# Patient Record
Sex: MALE | Race: OTHER | Employment: OTHER | URBAN - METROPOLITAN AREA
[De-identification: names, ages, dates, MRNs, and addresses within clinical notes are randomized per-mention and may not be internally consistent; named-entity substitution may affect disease eponyms.]

---

## 2024-02-15 ENCOUNTER — ANESTHESIA EVENT (OUTPATIENT)
Dept: ANESTHESIOLOGY | Facility: HOSPITAL | Age: 64
End: 2024-02-15

## 2024-02-15 ENCOUNTER — ANESTHESIA (OUTPATIENT)
Dept: ANESTHESIOLOGY | Facility: HOSPITAL | Age: 64
End: 2024-02-15

## 2024-02-15 NOTE — H&P (VIEW-ONLY)
Assessment  Diagnoses and all orders for this visit:    Recurrent Tear of left supraspinatus tendon        Discussion and Plan:    Unfortunately the patient does present with a recurrent full-thickness supraspinatus tendon tear that thankfully has minimal atrophy and is not significantly retracted.  Given his lack of improvement even in the acute postoperative phase from his previous left shoulder surgery it is likely this is a nonhealed repair, given the full-thickness nature of the tear and his lack of improvement following the surgery he is indicated at this time for revision arthroscopic rotator cuff repair.  We discussed the use of corticosteroid injection and further physical therapy but the patient did not wish to continue to go through nonoperative care as he had that extensively before surgery and following surgery and since has been living with the symptoms for such a long period of time I do think it is reasonable to consider repair at this time.  He understands that repair results in the revision setting are not as consistent as in the primary setting.    A thorough discussion was performed with the patient regarding the risks and benefit of operative and nonoperative treatment of their rotator cuff tear.  Risks discussed include but were not limited to infection, neurovascular injury, recurrent tear, nonhealing of the repair, need for further surgery, need for biceps tenodesis or tenotomy, stiffness, need for prolonged rehabilitation, as well as the risk of anesthesia.  After this discussion all questions were answered and informed consent was obtained in the office for arthroscopic revision rotator cuff repair of the left shoulder.  The patient will be scheduled for this procedure accordingly.     Subjective:   Patient ID: Ralph Manning is a 63 y.o. male      HPI  The patient presents with a chief complaint of left shoulder pain.  Patient reports he has a left shoulder open RTC repair at [facility] in 2015  "or 2016 at [facility].  He reports the shoulder never really got better after the surgery.  The pain began to get worse 1 year(s) ago and is not associated with an acute injury. The patient describes the pain as aching, dull, and sharp in intensity,  intermittent in timing, and localizes the pain to the  leftsubacromial joint.  The pain is worse with movement, overuse, and raising arm over head and relieved by rest.  The pain is not associated with numbness and tingling.  The pain is not associated with constitutional symptoms. The patient is awoken at night by the pain.  Patient has been performing his physical therapy directed HEP he learned following his RIGHT shoulder surgery.        The following portions of the patient's history were reviewed and updated as appropriate: allergies, current medications, past family history, past medical history, past social history, past surgical history and problem list.        Objective:  /80 (BP Location: Left arm, Patient Position: Sitting, Cuff Size: Large)   Pulse 65   Ht 5' 9\" (1.753 m)   Wt 122 kg (270 lb)   BMI 39.87 kg/m²       Left Shoulder Exam     Range of Motion   External rotation:  50 (full PROM)   Forward flexion:  130 (full PROM)   Internal rotation 0 degrees:  Lumbar     Muscle Strength   Abduction: 4/5   External rotation: 4/5     Tests   Sanders test: positive  Impingement: positive    Other   Erythema: absent  Sensation: normal  Pulse: present             Physical Exam  Vitals and nursing note reviewed.   Constitutional:       Appearance: He is well-developed.   HENT:      Head: Normocephalic and atraumatic.   Eyes:      Pupils: Pupils are equal, round, and reactive to light.   Cardiovascular:      Rate and Rhythm: Normal rate and regular rhythm.      Pulses: Normal pulses.      Heart sounds: Normal heart sounds.   Pulmonary:      Effort: Pulmonary effort is normal. No respiratory distress.      Breath sounds: Normal breath sounds.   Abdominal:      " General: Abdomen is flat. There is no distension.      Palpations: Abdomen is soft.   Musculoskeletal:      Cervical back: Normal range of motion and neck supple.   Skin:     General: Skin is warm and dry.   Neurological:      Mental Status: He is alert and oriented to person, place, and time.   Psychiatric:         Mood and Affect: Mood normal.         Behavior: Behavior normal.           I have personally reviewed pertinent films in PACS and my interpretation is as follows.  MRI left shoulder demonstrates full thickness supraspinatus tear, no atrophy and minimal retraction.  Minimal degenerative change glenohumeral joint    Scribe Attestation      I,:  Prachi Perez am acting as a scribe while in the presence of the attending physician.:       I,:  Sergio Reid MD personally performed the services described in this documentation    as scribed in my presence.:

## 2024-02-16 RX ORDER — CHOLECALCIFEROL (VITAMIN D3) 25 MCG
TABLET ORAL
COMMUNITY

## 2024-02-16 RX ORDER — ELECTROLYTES/DEXTROSE
SOLUTION, ORAL ORAL
COMMUNITY

## 2024-02-16 RX ORDER — BRIMONIDINE TARTRATE 1 MG/ML
1 SOLUTION/ DROPS OPHTHALMIC 2 TIMES DAILY
COMMUNITY
Start: 2023-09-07

## 2024-02-16 NOTE — PRE-PROCEDURE INSTRUCTIONS
Pre-Surgery Instructions:   Medication Instructions    Acetaminophen (TYLENOL PO) Hold day of surgery      albuterol (PROVENTIL HFA,VENTOLIN HFA) 90 mcg/act inhaler Use day of surgery if needed and bring with you      Alphagan P 0.1 % Take this medication day of surgery if normally taken in the morning.      ammonium lactate (LAC-HYDRIN) 5 % lotion Hold day of surgery      aspirin 81 MG tablet Hold from now till after procedure       atenolol (TENORMIN) 25 mg tablet Take this medication day of surgery if normally taken in the morning.      azelastine (ASTELIN) 0.1 % nasal spray Take this medication day of surgery if normally taken in the morning.      cetirizine (ZyrTEC) 10 mg tablet Hold day of surgery      Cholecalciferol (Vitamin D3) 25 MCG TABS Hold from now till after procedure       clotrimazole-betamethasone (LOTRISONE) 1-0.05 % cream Hold day of surgery      Diclofenac Sodium (VOLTAREN) 1 % Hold day of surgery      docusate sodium (COLACE) 100 mg capsule Hold day of surgery      DULoxetine (CYMBALTA) 30 mg delayed release capsule Take this medication day of surgery if normally taken in the morning.      fenofibrate micronized (LOFIBRA) 67 MG capsule Take this medication day of surgery if normally taken in the morning.      fluticasone (FLONASE) 50 mcg/act nasal spray Take this medication day of surgery if normally taken in the morning.      fluticasone-salmeterol (Advair HFA) 45-21 MCG/ACT inhaler Take this medication day of surgery if normally taken in the morning.      furosemide (LASIX) 20 mg tablet Hold day of surgery      glimepiride (AMARYL) 2 mg tablet Hold day of surgery      Jardiance 25 MG TABS Hold for 4 days before procedure.      lisinopril (ZESTRIL) 5 mg tablet Hold day of surgery      Lurasidone HCl (LATUDA) 60 MG TABS Normally takes at night.      metFORMIN (GLUCOPHAGE) 500 mg tablet Hold day of surgery      Multiple Vitamin (Multivitamin Adult) TABS Hold from now till after procedure        Multiple Vitamin (MULTIVITAMIN) tablet Hold from now till after procedure       Omega-3 Fatty Acids (FISH OIL) 1200 MG CAPS Hold from now till after procedure       patient supplied medication Hold from now till after procedure       polyethylene glycol (MIRALAX) 17 g packet Hold day of surgery      Silodosin 8 MG CAPS Normally takes at night.      simvastatin (ZOCOR) 20 mg tablet Normally takes at night.      sodium chloride (OCEAN) 0.65 % nasal spray May use day of surgery if needed      testosterone cypionate (DEPO-TESTOSTERONE) 200 mg/mL SOLN injection    [DISCONTINUED] budesonide-formoterol (SYMBICORT) 160-4.5 mcg/act inhaler     [DISCONTINUED] naproxen (NAPROSYN) 500 mg tablet     Medication instructions for day surgery reviewed. Please use only a sip of water to take your instructed medications. Avoid all over the counter vitamins, supplements and NSAIDS for one week prior to surgery per anesthesia guidelines. Tylenol is ok to take as needed.     You will receive a call one business day prior to surgery with an arrival time and hospital directions. If your surgery is scheduled on a Monday, the hospital will be calling you on the Friday prior to your surgery. If you have not heard from anyone by 8pm, please call the hospital supervisor through the hospital  at [phone]. ([place] [phone] or [place] [phone]).    Do not eat or drink anything after midnight the night before your surgery, including candy, mints, lifesavers, or chewing gum. Do not drink alcohol 24hrs before your surgery. Try not to smoke at least 24hrs before your surgery.       Follow the pre surgery showering instructions as listed in the “My Surgical Experience Booklet” or otherwise provided by your surgeon's office. Do not use a blade to shave the surgical area 1 week before surgery. It is okay to use a clean electric clippers up to 24 hours before surgery. Do not apply any lotions, creams, including makeup, cologne,  deodorant, or perfumes after showering on the day of your surgery. Do not use dry shampoo, hair spray, hair gel, or any type of hair products.     No contact lenses, eye make-up, or artificial eyelashes. Remove nail polish, including gel polish, and any artificial, gel, or acrylic nails if possible. Remove all jewelry including rings and body piercing jewelry.     Wear causal clothing that is easy to take on and off. Consider your type of surgery.    Keep any valuables, jewelry, piercings at home. Please bring any specially ordered equipment (sling, braces) if indicated.    Arrange for a responsible person to drive you to and from the hospital on the day of your surgery. Visitor Guidelines discussed.     Call the surgeon's office with any new illnesses, exposures, or additional questions prior to surgery.    Please reference your “My Surgical Experience Booklet” for additional information to prepare for your upcoming surgery.

## 2024-02-22 ENCOUNTER — ANESTHESIA EVENT (OUTPATIENT)
Dept: PERIOP | Facility: AMBULARY SURGERY CENTER | Age: 64
End: 2024-02-22
Payer: COMMERCIAL

## 2024-02-23 ENCOUNTER — HOSPITAL ENCOUNTER (OUTPATIENT)
Facility: AMBULARY SURGERY CENTER | Age: 64
Setting detail: OUTPATIENT SURGERY
Discharge: HOME | End: 2024-02-23
Attending: ORTHOPAEDIC SURGERY | Admitting: ORTHOPAEDIC SURGERY
Payer: COMMERCIAL

## 2024-02-23 ENCOUNTER — ANESTHESIA (OUTPATIENT)
Dept: PERIOP | Facility: AMBULARY SURGERY CENTER | Age: 64
End: 2024-02-23
Payer: COMMERCIAL

## 2024-02-23 VITALS
SYSTOLIC BLOOD PRESSURE: 116 MMHG | HEIGHT: 69 IN | BODY MASS INDEX: 40.73 KG/M2 | RESPIRATION RATE: 16 BRPM | OXYGEN SATURATION: 92 % | TEMPERATURE: 97.1 F | WEIGHT: 275 LBS | DIASTOLIC BLOOD PRESSURE: 71 MMHG | HEART RATE: 73 BPM

## 2024-02-23 DIAGNOSIS — M75.102 TEAR OF LEFT SUPRASPINATUS TENDON: Primary | ICD-10-CM

## 2024-02-23 LAB — GLUCOSE SERPL-MCNC: 102 MG/DL (ref 65–140)

## 2024-02-23 PROCEDURE — 29827 SHO ARTHRS SRG RT8TR CUF RPR: CPT | Performed by: ORTHOPAEDIC SURGERY

## 2024-02-23 PROCEDURE — 29828 SHO ARTHRS SRG BICP TENODSIS: CPT | Performed by: ORTHOPAEDIC SURGERY

## 2024-02-23 PROCEDURE — 82948 REAGENT STRIP/BLOOD GLUCOSE: CPT

## 2024-02-23 PROCEDURE — 29827 SHO ARTHRS SRG RT8TR CUF RPR: CPT | Performed by: PHYSICIAN ASSISTANT

## 2024-02-23 PROCEDURE — C1713 ANCHOR/SCREW BN/BN,TIS/BN: HCPCS | Performed by: ORTHOPAEDIC SURGERY

## 2024-02-23 PROCEDURE — C9290 INJ, BUPIVACAINE LIPOSOME: HCPCS | Performed by: STUDENT IN AN ORGANIZED HEALTH CARE EDUCATION/TRAINING PROGRAM

## 2024-02-23 PROCEDURE — 29828 SHO ARTHRS SRG BICP TENODSIS: CPT | Performed by: PHYSICIAN ASSISTANT

## 2024-02-23 DEVICE — BIO-COMP SWVLK C, CLD 4.75X19.1MM
Type: IMPLANTABLE DEVICE | Site: SHOULDER | Status: FUNCTIONAL
Brand: ARTHREX®

## 2024-02-23 DEVICE — SP FIBERTAK RC, DBLOAD TAPE BL/W, BLK/W
Type: IMPLANTABLE DEVICE | Site: SHOULDER | Status: FUNCTIONAL
Brand: ARTHREX®

## 2024-02-23 RX ORDER — SODIUM CHLORIDE, SODIUM LACTATE, POTASSIUM CHLORIDE, CALCIUM CHLORIDE 600; 310; 30; 20 MG/100ML; MG/100ML; MG/100ML; MG/100ML
INJECTION, SOLUTION INTRAVENOUS CONTINUOUS PRN
Status: DISCONTINUED | OUTPATIENT
Start: 2024-02-23 | End: 2024-02-23

## 2024-02-23 RX ORDER — CHLORHEXIDINE GLUCONATE ORAL RINSE 1.2 MG/ML
15 SOLUTION DENTAL ONCE
Status: DISCONTINUED | OUTPATIENT
Start: 2024-02-23 | End: 2024-02-23 | Stop reason: HOSPADM

## 2024-02-23 RX ORDER — OXYCODONE HYDROCHLORIDE 5 MG/1
5 TABLET ORAL EVERY 4 HOURS PRN
Status: DISCONTINUED | OUTPATIENT
Start: 2024-02-23 | End: 2024-02-23 | Stop reason: HOSPADM

## 2024-02-23 RX ORDER — MIDAZOLAM HYDROCHLORIDE 2 MG/2ML
INJECTION, SOLUTION INTRAMUSCULAR; INTRAVENOUS AS NEEDED
Status: DISCONTINUED | OUTPATIENT
Start: 2024-02-23 | End: 2024-02-23

## 2024-02-23 RX ORDER — FENTANYL CITRATE/PF 50 MCG/ML
25 SYRINGE (ML) INJECTION
Status: DISCONTINUED | OUTPATIENT
Start: 2024-02-23 | End: 2024-02-23 | Stop reason: HOSPADM

## 2024-02-23 RX ORDER — BUPIVACAINE HYDROCHLORIDE 5 MG/ML
INJECTION, SOLUTION EPIDURAL; INTRACAUDAL
Status: COMPLETED | OUTPATIENT
Start: 2024-02-23 | End: 2024-02-23

## 2024-02-23 RX ORDER — ALBUTEROL SULFATE 2.5 MG/3ML
2.5 SOLUTION RESPIRATORY (INHALATION) ONCE AS NEEDED
Status: DISCONTINUED | OUTPATIENT
Start: 2024-02-23 | End: 2024-02-23 | Stop reason: HOSPADM

## 2024-02-23 RX ORDER — ONDANSETRON 2 MG/ML
4 INJECTION INTRAMUSCULAR; INTRAVENOUS ONCE AS NEEDED
Status: DISCONTINUED | OUTPATIENT
Start: 2024-02-23 | End: 2024-02-23 | Stop reason: HOSPADM

## 2024-02-23 RX ORDER — MEPERIDINE HYDROCHLORIDE 25 MG/ML
12.5 INJECTION INTRAMUSCULAR; INTRAVENOUS; SUBCUTANEOUS
Status: DISCONTINUED | OUTPATIENT
Start: 2024-02-23 | End: 2024-02-23 | Stop reason: HOSPADM

## 2024-02-23 RX ORDER — DEXAMETHASONE SODIUM PHOSPHATE 10 MG/ML
INJECTION, SOLUTION INTRAMUSCULAR; INTRAVENOUS AS NEEDED
Status: DISCONTINUED | OUTPATIENT
Start: 2024-02-23 | End: 2024-02-23

## 2024-02-23 RX ORDER — SODIUM CHLORIDE, SODIUM LACTATE, POTASSIUM CHLORIDE, CALCIUM CHLORIDE 600; 310; 30; 20 MG/100ML; MG/100ML; MG/100ML; MG/100ML
125 INJECTION, SOLUTION INTRAVENOUS CONTINUOUS
Status: DISCONTINUED | OUTPATIENT
Start: 2024-02-23 | End: 2024-02-23 | Stop reason: HOSPADM

## 2024-02-23 RX ORDER — HYDROMORPHONE HCL/PF 1 MG/ML
0.5 SYRINGE (ML) INJECTION
Status: DISCONTINUED | OUTPATIENT
Start: 2024-02-23 | End: 2024-02-23 | Stop reason: HOSPADM

## 2024-02-23 RX ORDER — PROPOFOL 10 MG/ML
INJECTION, EMULSION INTRAVENOUS AS NEEDED
Status: DISCONTINUED | OUTPATIENT
Start: 2024-02-23 | End: 2024-02-23

## 2024-02-23 RX ORDER — PROMETHAZINE HYDROCHLORIDE 25 MG/ML
12.5 INJECTION, SOLUTION INTRAMUSCULAR; INTRAVENOUS ONCE AS NEEDED
Status: DISCONTINUED | OUTPATIENT
Start: 2024-02-23 | End: 2024-02-23 | Stop reason: HOSPADM

## 2024-02-23 RX ORDER — CEFAZOLIN SODIUM 2 G/50ML
2000 SOLUTION INTRAVENOUS ONCE
Status: DISCONTINUED | OUTPATIENT
Start: 2024-02-23 | End: 2024-02-23

## 2024-02-23 RX ORDER — ONDANSETRON 2 MG/ML
4 INJECTION INTRAMUSCULAR; INTRAVENOUS EVERY 6 HOURS PRN
Status: CANCELLED | OUTPATIENT
Start: 2024-02-23

## 2024-02-23 RX ORDER — ACETAMINOPHEN 325 MG/1
650 TABLET ORAL EVERY 6 HOURS PRN
Status: CANCELLED | OUTPATIENT
Start: 2024-02-23

## 2024-02-23 RX ORDER — LIDOCAINE HYDROCHLORIDE 20 MG/ML
INJECTION, SOLUTION EPIDURAL; INFILTRATION; INTRACAUDAL; PERINEURAL AS NEEDED
Status: DISCONTINUED | OUTPATIENT
Start: 2024-02-23 | End: 2024-02-23

## 2024-02-23 RX ORDER — ONDANSETRON 2 MG/ML
INJECTION INTRAMUSCULAR; INTRAVENOUS AS NEEDED
Status: DISCONTINUED | OUTPATIENT
Start: 2024-02-23 | End: 2024-02-23

## 2024-02-23 RX ORDER — OXYCODONE HYDROCHLORIDE 5 MG/1
TABLET ORAL
Qty: 13 TABLET | Refills: 0 | Status: SHIPPED | OUTPATIENT
Start: 2024-02-23

## 2024-02-23 RX ORDER — CEFAZOLIN SODIUM 1 G/50ML
1000 SOLUTION INTRAVENOUS ONCE
Status: DISCONTINUED | OUTPATIENT
Start: 2024-02-23 | End: 2024-02-23 | Stop reason: HOSPADM

## 2024-02-23 RX ORDER — FENTANYL CITRATE 50 UG/ML
INJECTION, SOLUTION INTRAMUSCULAR; INTRAVENOUS AS NEEDED
Status: DISCONTINUED | OUTPATIENT
Start: 2024-02-23 | End: 2024-02-23

## 2024-02-23 RX ORDER — CEFAZOLIN SODIUM 2 G/50ML
2000 SOLUTION INTRAVENOUS ONCE
Status: DISCONTINUED | OUTPATIENT
Start: 2024-02-23 | End: 2024-02-23 | Stop reason: HOSPADM

## 2024-02-23 RX ADMIN — LIDOCAINE HYDROCHLORIDE 50 MG: 20 INJECTION, SOLUTION EPIDURAL; INFILTRATION; INTRACAUDAL; PERINEURAL at 07:35

## 2024-02-23 RX ADMIN — PROPOFOL 300 MG: 10 INJECTION, EMULSION INTRAVENOUS at 07:35

## 2024-02-23 RX ADMIN — DEXAMETHASONE SODIUM PHOSPHATE 10 MG: 10 INJECTION, SOLUTION INTRAMUSCULAR; INTRAVENOUS at 07:35

## 2024-02-23 RX ADMIN — ONDANSETRON 4 MG: 2 INJECTION INTRAMUSCULAR; INTRAVENOUS at 07:35

## 2024-02-23 RX ADMIN — MIDAZOLAM 2 MG: 1 INJECTION INTRAMUSCULAR; INTRAVENOUS at 07:20

## 2024-02-23 RX ADMIN — Medication 3000 MG: at 07:30

## 2024-02-23 RX ADMIN — BUPIVACAINE 20 ML: 13.3 INJECTION, SUSPENSION, LIPOSOMAL INFILTRATION at 07:18

## 2024-02-23 RX ADMIN — FENTANYL CITRATE 25 MCG: 50 INJECTION INTRAMUSCULAR; INTRAVENOUS at 07:52

## 2024-02-23 RX ADMIN — SODIUM CHLORIDE, SODIUM LACTATE, POTASSIUM CHLORIDE, AND CALCIUM CHLORIDE: .6; .31; .03; .02 INJECTION, SOLUTION INTRAVENOUS at 07:23

## 2024-02-23 RX ADMIN — BUPIVACAINE HYDROCHLORIDE 10 ML: 5 INJECTION, SOLUTION EPIDURAL; INTRACAUDAL at 07:18

## 2024-02-23 NOTE — ANESTHESIA POSTPROCEDURE EVALUATION
Post-Op Assessment Note    CV Status:  Stable  Pain Score: 0    Pain management: adequate       Mental Status:  Arousable and sleepy   Hydration Status:  Stable   PONV Controlled:  Controlled   Airway Patency:  Patent     Post Op Vitals Reviewed: Yes    No anethesia notable event occurred.    Staff: CRNA               BP   121/72   Temp 97.6   Pulse 77   Resp 18   SpO2 99

## 2024-02-23 NOTE — OP NOTE
OPERATIVE REPORT  PATIENT NAME: Ralph Leyva    :  1960  MRN: [id number]  Pt Location: [facility] OR ROOM 06    SURGERY DATE: 2024     SURGEON: Sergio Reid MD     ASSISTANT: Carmen Herrera PA-C     NOTE: Carmen Herrera PA-C was present throughout the entire procedure and performed essential assistance with patient prepping, draping, positioning, suture management, wound closure, sterile dressing application and sling application, all under my direct supervision.     NOTE: No qualified resident physician was available for assistance    PREOPERATIVE DIAGNOSIS:  Left Shoulder Recurrent Supraspinatus Tear    POSTOPERATIVE DIAGNOSIS: Same plus Long Head Biceps Tendon Partial Tear    PROCEDURES: Surgical Arthroscopy Left Shoulder with Revision Rotator Cuff Repair and Long Head Biceps Tenodesis    ANESTHESIA STAFF:  Ramon Troy MD      ANESTHESIA TYPE: General LMA with ultrasound guided interscalene block (Exparel). The interscalene block was provided by the anesthesia staff per my request for postoperative pain control and to decrease the use of postoperative narcotic medication for pain control.    COMPLICATIONS: None    FINDINGS: Recurrent Supraspinatus Tear and Long Head Biceps Tendon Partial Tear    SPECIMEN(S):  None    ESTIMATED BLOOD LOSS: Minimal    INDICATION:  Briefly, the patient is a 63 y.o.  male with left shoulder pain. MRI scan confirmed a recurrent full thickness supraspinatus tear following a repair in the past. The patient elected for arthroscopic treatment. Informed consent was obtained after a thorough discussion of the risks and benefits of the procedure, as well as alternatives to the procedure.     OPERATIVE TECHNIQUE:  On the day of surgery, I identified the patient’s left shoulder and marked it with my initials. The patient was taken to the operating room where anesthesia was induced and 3 grams of IV Cefazolin were given. The patient was examined in the supine position  and was found to have full range of motion of the left shoulder with no instability. The patient was then positioned in the semilateral [facility] chair position. All bony prominences were padded. The shoulder was prepped and draped in normal sterile fashion. After a time-out for safety, a standard posterolateral arthroscopic portal was made. Glenohumeral evaluation revealed early degenerative changes of the humeral head and the glenoid with no loose bodies. There was a recurrent, full thickness supraspinatus tear with minimal retraction.  The long head biceps tendon did have high-grade partial tearing associated with this supraspinatus tendon tear.  The infraspinatus and subscapularis were intact.  After the intra-articular evaluation was completed, the scope was then placed in the subacromial space through the same portal where a thorough bursectomy was performed. The full thickness supraspinatus tear was found to measure 1.2 cm from anterior to posterior and was able to be easily reduced to the tuberosity.  The tuberosity was prepared in routine fashion and a double row suture bridge configuration repair with one medial 2.6 mm double loaded Arthrex All-Suture anchor and one lateral 4.75 mm Arthrex BioComposite SwiveLock anchor using four stiches through the tendon in horizontal mattress fashion was performed achieving anatomic reduction of the rotator cuff tendon to the tuberosity. The long head of biceps tendon was indicated for tenodesis and it was incorporated into the rotator cuff repair by using the anterior limb of the most anterior anchor through and around the long head of biceps in a loop whipstitch fashion; the long head of biceps was then released. When the medial row of the rotator cuff repair was tied down, this incorporated the long head biceps tenodesis into our repair. The previous subacromial decompression did not have residual fraying of the CA ligament or subacromial spur so a revision  subacromial decompression nor acromioplasty were indicated. The area was then irrigated. Scope was withdrawn. Wounds were closed with 4-0 Monocryl and Histoacryl. Sterile dressings and a sling with an abduction pillow was placed. The patient was awoken without complication and returned to the recovery room in good condition. We will see the patient back in the office next week to initiate therapy following standard rotator cuff repair rehabilitation protocol. At the end of procedure, the counts were correct.     PATIENT DISPOSITION:  Stable to PACU      SIGNATURE: Sergio Reid MD  DATE: February 23, 2024  TIME: 8:37 AM

## 2024-02-23 NOTE — ANESTHESIA PREPROCEDURE EVALUATION
Procedure:  REVISION SHOULDER ARTHROSCOPIC ROTATOR CUFF REPAIR (Left: Shoulder)    Relevant Problems   No relevant active problems        Physical Exam    Airway    Mallampati score: III  TM Distance: >3 FB  Neck ROM: full     Dental   No notable dental hx     Cardiovascular      Pulmonary      Other Findings        Anesthesia Plan  ASA Score- 3     Anesthesia Type- general with ASA Monitors.         Additional Monitors:     Airway Plan: LMA.           Plan Factors-Exercise tolerance (METS): >4 METS.    Chart reviewed. EKG reviewed.  Existing labs reviewed. Patient summary reviewed.    Patient is not a current smoker.      There is medical exclusion for perioperative obstructive sleep apnea risk education.        Induction- intravenous.    Postoperative Plan-     Informed Consent- Anesthetic plan and risks discussed with patient.  I personally reviewed this patient with the CRNA. Discussed and agreed on the Anesthesia Plan with the CRNA..

## 2024-02-23 NOTE — DISCHARGE INSTR - AVS FIRST PAGE
You are being scheduled for a shoulder arthroscopy to treat your symptoms.  Below are some instructions and information on what to expect before and after your surgery.        Pre-Surgical Preparation for Arthroscopic Shoulder Surgery:     You will be contacted the evening prior to your surgery to confirm the scheduled time of the procedure and when to arrive at the hospital.   Do not eat or drink anything after midnight the night before your surgery.  Since you are having out-patient surgery, make sure that you have someone who can drive you home later in the day.  Also, prepare that person for a long day, as the process of safely preparing for and recovering from the procedure is more time consuming than the actual procedure!      As you will be in a sling after surgery, please wear or bring a loose fitting button-down shirt so that you can easily place this over the sling when you leave the surgical suite.  This avoids having to place the operative arm in a sleeve.  Most patients find that this is the easiest outfit to wear for the first week or so after surgery so you may want to plan accordingly.    Most patients find that lying down in bed after shoulder surgery accentuates their discomfort.  This is likely related to the effect of gravity on the swelling in the shoulder.  As a result, most patients sleep better in a recliner or in bed with pillows propped up behind their back for the first few days or weeks after surgery.  It is a good idea to plan for this ahead of time so there will be less hassle getting things set up the night after surgery.       What to Expect After Arthroscopic Shoulder Surgery:    It is normal to have swelling and discomfort in the shoulder for several days or a week after surgery.  It is also normal to have a small amount of drainage from the surgical wounds (especially the first few days after surgery), as we put fluid into the shoulder to visualize the structures during surgery.   "It is NOT normal to have foul smelling, purulent drainage and if this is noted, please contact the office immediately or proceed to the emergency room for evaluation as this may indicate an infection.     Applying ice bags to the shoulder may help with pain that is not controlled by the regional block.  Ice should be applied 20-30 minutes at a time, every hour or two.  Make sure to put a thin towel or T-shirt next to your skin to avoid direct contact of the ice with the skin. Icing is most helpful in the first 48 hours, although many people find that continuing past this time frame lessens their postoperative pain.  Please note that your post-operative dressing is not conductive to ice, so if you need to, it is okay to remove that dressing even the night after surgery and place band-aids over the wounds in order for the ice to take effect.     Pain Control    Most patients will receive a nerve block, the local anesthetic may keep your whole arm numb for up to 4 days.  You will be given a prescription for narcotic pain medication when you are discharged from the hospital.  With the newer nerve block that is being utilized, patients are rarely requiring the use of this narcotic pain medication.  If you find you do not tolerate that type of pain medicine well, call our office and we will try another one.     In addition to the narcotic pain medication, it is safe to use an anti-inflammatory (unless the patient has a medical condition that would not allow safe use of this mediation).  This includes the Advil, Motrin, Ibuprofen and Alleve category of medications.  Simply follow the over the counter dosing on the package and use as indicated as another adjunct.  Importantly since these medications are all very similar, use only one of them.  Tylenol is a separate medication that can be utilized as well and can be taken at the same time as the other medication or given in a \"staggered\" manner.  Just make sure that you " follow the dosing on the over the counter bottle instructions.  Also make sure that the pain medication prescribed by Dr Reid's team does not contain acetaminophen (this is found in Percocet and Vicodin).   Typically we do not prescribe those types of pain medications but if for some reason that has been prescribed DO NOT add more Tylenol (acetaminophen) as you could end up taking too much of that medication.    As mentioned above, most patients find that lying down accentuates their discomfort. You might sleep better in a recliner, or propped up in bed.     Dr. Reid encourages patients to safely ambulate around the house as much as possible in the first few days after the procedure as this can help with blood circulation in the legs. While the incidence of blood clots is very rare following shoulder surgery, early ambulation is a great way to help decrease the already low rate.    24 hours after the surgery you may remove the bandage and cover incisions with Band-Aids if needed. At that time you may shower, the wounds will have a surgical glue that will protect them from shower water but do not submerge your incisions directly (bathing or swimming) until at least 2 weeks post-operatively.  It is safe to let the arm hang at the side and take a shower and put on a shirt without the sling on.  Just make sure that you do not use the operative are to reach out and grab anything as that may damage the repair.  When you are done showering and getting dressed please return the operative arm to the sling.    Unless noted otherwise in your discharge paperwork, Dr. Reid uses absorbable sutures so they do not need to be removed.    Dr. Reid’s physician assistant (PA) will see you in the office a few days after the procedure to review the intra-operative findings and to initiate physical therapy if appropriate.  A post-operative appointment should have been scheduled for you already, but if for some reason this did  not happen, please call the office to make one.     Physical therapy is important after nearly all shoulder surgeries and a detailed rehabilitation plan based on the specific intra-operative findings and procedures will be provided to your therapist at the first post-operative office visit.  Most patients have post-operative therapy appointments scheduled pre-operatively, but if you do not, that will be handled at the first post-operative office visit.  Unless expressly directed otherwise it is safe to remove the sling even the first day after the surgery and let the arm hang by the side.  This allows patients to shower and even put a shirt on (bad arm in the sleeve first).  It is also safe to flex and extend their wrist, hand and fingers as much as possible when the block wears off.  These simple motions can serve to pump fluid out of the forearm and decrease swelling in the arm.

## 2024-02-23 NOTE — ANESTHESIA PROCEDURE NOTES
Peripheral Block    Patient location during procedure: holding area  Start time: 2/23/2024 7:10 AM  Reason for block: at surgeon's request and post-op pain management  Staffing  Performed by: Delon Troy MD  Authorized by: Delon Troy MD    Preanesthetic Checklist  Completed: patient identified, IV checked, site marked, risks and benefits discussed, surgical consent, monitors and equipment checked, pre-op evaluation and timeout performed  Peripheral Block  Patient position: supine  Prep: ChloraPrep  Patient monitoring: frequent blood pressure checks, continuous pulse oximetry and heart rate  Block type: Interscalene  Laterality: left  Injection technique: single-shot  Procedures: ultrasound guided, Ultrasound guidance required for the procedure to increase accuracy and safety of medication placement and decrease risk of complications.  Ultrasound permanent image savedbupivacaine (PF) (MARCAINE) 0.5 % injection 20 mL - Perineural   10 mL - 2/23/2024 7:18:00 AM  bupivacaine liposomal (EXPAREL) 1.3 % injection 20 mL - Perineural   20 mL - 2/23/2024 7:18:00 AM  Needle  Needle type: Stimuplex   Needle gauge: 22 G  Needle length: 4 in  Needle localization: anatomical landmarks and ultrasound guidance  Needle insertion depth: 6 cm  Assessment  Injection assessment: incremental injection, frequent aspiration, injected with ease, negative aspiration, negative for heart rate change, no paresthesia on injection, no symptoms of intraneural/intravenous injection and needle tip visualized at all times  Paresthesia pain: none  Post-procedure:  site cleaned  patient tolerated the procedure well with no immediate complications

## 2024-02-23 NOTE — INTERVAL H&P NOTE
H&P reviewed. After examining the patient I find no changes in the patients condition since the H&P had been written.    Vitals:    02/23/24 0656   BP: (!) 178/107   Pulse: 60   Resp: 18   Temp: (!) 97.1 °F (36.2 °C)   SpO2: 93%

## 2024-02-26 ENCOUNTER — OFFICE VISIT (OUTPATIENT)
Dept: OBGYN CLINIC | Facility: OTHER | Age: 64
End: 2024-02-26

## 2024-02-26 VITALS
HEIGHT: 69 IN | WEIGHT: 275 LBS | HEART RATE: 82 BPM | DIASTOLIC BLOOD PRESSURE: 96 MMHG | BODY MASS INDEX: 40.73 KG/M2 | SYSTOLIC BLOOD PRESSURE: 144 MMHG

## 2024-02-26 DIAGNOSIS — Z98.890 S/P LEFT ROTATOR CUFF REPAIR: Primary | ICD-10-CM

## 2024-02-26 PROCEDURE — 99024 POSTOP FOLLOW-UP VISIT: CPT | Performed by: PHYSICIAN ASSISTANT

## 2024-02-26 NOTE — PROGRESS NOTES
"Surgery: SALS w/Revision RCR, BT on 2/23/24    S: Patient is doing well.  Denies acute post-op events. Has therapy scheduled for Tomorrow    /96 (BP Location: Right arm, Patient Position: Sitting, Cuff Size: Large)   Pulse 82   Ht 5' 9\" (1.753 m)   Wt 125 kg (275 lb)   BMI 40.61 kg/m²     O: LEFT shoulder  Shoulder in sling  Arthroscopic portals without erythema or drainage  Mild ecchymosis around portals  Good elbow, wrist and hand range of motion  Skin - warm and dry  SI  NVI    A/P: 3 days following arthroscopic left shoulder revision rotator cuff repair and bicep tenodesis  NWB LUE in sling x 6 weeks (remove sling on 4/5/24).  Can remove Pillow at any time as it's for comfort only.  Intra-operative pictures were reviewed in detail  Formal physical therapy - following standard RCR protocol (attached to AVS)  HEP - per therapy.  For now, may start elbow/wrist/hand range of motion.  Pendulums to tolerance  Pain control - Tylenol 1000 mg every 8 hours  Ice as needed - 20 minutes on and 20 minutes off  Follow up in 8 weeks   "

## 2024-02-27 ENCOUNTER — EVALUATION (OUTPATIENT)
Dept: PHYSICAL THERAPY | Facility: CLINIC | Age: 64
End: 2024-02-27
Payer: COMMERCIAL

## 2024-02-27 DIAGNOSIS — M75.102 TEAR OF LEFT SUPRASPINATUS TENDON: ICD-10-CM

## 2024-02-27 PROCEDURE — 97110 THERAPEUTIC EXERCISES: CPT

## 2024-02-27 PROCEDURE — 97161 PT EVAL LOW COMPLEX 20 MIN: CPT

## 2024-02-27 NOTE — PROGRESS NOTES
PT Evaluation     Today's date: 2024  Patient name: Ralph Leyva  : 1960  MRN: [id number]  Referring provider: Sergio Reid*  Dx:   Encounter Diagnosis     ICD-10-CM    1. Tear of left supraspinatus tendon  M75.102 Ambulatory Referral to Physical Therapy          Start Time: 1525  Stop Time: 1610  Total time in clinic (min): 45 minutes    Assessment  Assessment details: Pt is a 63 y.o. year old male presenting to physical therapy for Tear of left supraspinatus tendon.  He presents with the following impairments: significantly decreased L shoulder strength, mobility, ROM, scapular mobility, and functional ability due to post operative status affecting his function with overhead reaching, bathing, cleaning, ADLs, and functional ability.  Pt will benefit from skilled physical therapy to address functional limitations noted in evaluation and meet patient goals.   Impairments: abnormal muscle firing, abnormal or restricted ROM, activity intolerance, impaired physical strength, lacks appropriate home exercise program, pain with function and poor body mechanics    Symptom irritability: moderateBarriers to therapy: s/p revision of L shoulder arthroscopic RTC repair with biceps tenodesis on 24  Understanding of Dx/Px/POC: good   Prognosis: good    Goals  ST. Pt will be independent with HEP.  2. Pt will improve L shoulder PROM to 90 degrees to meet protocol.  3. Pt will improve scapular mobility to good to improve tolerance with passive shoulder activities.   LT. Pt will improve ability to reach overhead with 2/10 pain or less.  2. Pt will improve L shoulder strength grossly by 2 grades or more to improve functional reaching ability  3.  Pt will improve L shoulder ROM to WNL compared to RUE.    Plan  Patient would benefit from: PT eval and skilled physical therapy  Planned modality interventions: biofeedback, manual electrical stimulation, microcurrent electrical stimulation,  TENS, electrical stimulation/Russian stimulation, thermotherapy: hydrocollator packs, cryotherapy and unattended electrical stimulation  Planned therapy interventions: abdominal trunk stabilization, joint mobilization, manual therapy, massage, ADL retraining, neuromuscular re-education, body mechanics training, patient education, postural training, strengthening, stretching, therapeutic activities, therapeutic exercise, flexibility, functional ROM exercises and home exercise program  Frequency: 2x week  Duration in weeks: 8  Treatment plan discussed with: patient        Subjective Evaluation    History of Present Illness  Mechanism of injury: Pt presents to the clinic with history of s/p revision of L shoulder arthroscopic RTC repair with biceps tenodesis on 24. Pt stated that he has been keeping his sling on but has been taking it off to bathe and going to the bathroom. Pt stated that he has been having some back pain since the surgery but otherwise his pain is well managed with tylenol and has been icing everyday which has also helped. Pt stated that he is unsure whether or not his sling is on correctly. Pt stated that he had his L RTC repaired back in 2017 and it was done at [facility] and therapy went well but did not go as well as his R RTC repair which was done at [facility].  Patient Goals  Patient goals for therapy: increased motion, improved balance, decreased pain, decreased edema, increased strength, independence with ADLs/IADLs and return to sport/leisure activities    Pain  Current pain ratin  At best pain ratin  At worst pain ratin          Objective     Active Range of Motion     Right Shoulder   Normal active range of motion    Additional Active Range of Motion Details  L shoulder ROM was not assessed today, will assess L PROM at next visit.     Scapular Mobility   Left Shoulder   Scapular mobility: poor    Right Shoulder   Scapular mobility: good    General Comments:      Shoulder  "Comments   Pt had slightly ill fitting L shoulder sling, corrected pt's sling fit.              Precautions: s/p L shoulder arthroscopic RTC repair with biceps tenodesis on 2/23/24  As per MD (2/26/24): NWB LUE in sling x 6 weeks (remove sling on 4/5/24).  Can remove Pillow at any time as it's for comfort only.  For now, may start elbow/wrist/hand range of motion. Pendulums to tolerance   Protocol in office visit with MD on 2/26/24    Date 2/27            Visit # IE            FOTO IE             Re-eval IE              Manuals 2/27            L shoulder PROM nv                                                   Neuro Re-Ed 2/27            Scap retracts 5x3\"            Pendulums 30\" fwd,s/s                                                                Pt Ivan BOWSER (sling, HEP)            Ther Ex 2/27            Wrist flex AROM 10x             Wrist ext AROM 10x            Elbow flex/ext AROM 10x             Towel squeezes nv            Table slides hold                                                   Ther Activity                                       Gait Training                                       Modalities 2/27            PROM prn                              "

## 2024-02-29 ENCOUNTER — OFFICE VISIT (OUTPATIENT)
Dept: PHYSICAL THERAPY | Facility: CLINIC | Age: 64
End: 2024-02-29
Payer: COMMERCIAL

## 2024-02-29 DIAGNOSIS — M75.102 TEAR OF LEFT SUPRASPINATUS TENDON: Primary | ICD-10-CM

## 2024-02-29 PROCEDURE — 97140 MANUAL THERAPY 1/> REGIONS: CPT

## 2024-02-29 PROCEDURE — 97110 THERAPEUTIC EXERCISES: CPT

## 2024-02-29 NOTE — PROGRESS NOTES
"Daily Note     Today's date: 2024  Patient name: Ralph Leyva  : 1960  MRN: [id number]  Referring provider: Sergio Reid*  Dx:   Encounter Diagnosis     ICD-10-CM    1. Tear of left supraspinatus tendon  M75.102           Start Time: 1400  Stop Time: 1435  Total time in clinic (min): 35 minutes    Subjective: Pt reports he is feeling ok today, no new c/o pain or symptoms. Pt states he has been compliant with his HEP and sling.       Objective: See treatment diary below      Assessment: Tolerated treatment well. Patient demonstrated fatigue post treatment and would benefit from continued PT.  Pt performed exercises as noted with no signs of increased pain or adverse symptoms. Pt educated on protocol and progression of treatment.       Plan: Continue per plan of care.      Precautions: s/p L shoulder arthroscopic RTC repair with biceps tenodesis on 24  As per MD (24): NWB LUE in sling x 6 weeks (remove sling on 24).  Can remove Pillow at any time as it's for comfort only.  For now, may start elbow/wrist/hand range of motion. Pendulums to tolerance   Protocol in office visit with MD on 24    Date            Visit # IE 2           FOTO IE             Re-eval IE              Manuals            L shoulder PROM nv HY                                                  Neuro Re-Ed            Scap retracts 5x3\" 2x10 3\"           Pendulums 30\" fwd,s/s 30\" fwd,s/s                                                               Pt Ivan BOWSER (sling, HEP)            Ther Ex            Wrist flex AROM 10x  2x10           Wrist ext AROM 10x 2x10           Elbow flex/ext AROM 10x  2x10           Towel squeezes nv Red 20x5\"           Table slides hold 10x10\" pain free                                                  Ther Activity                                       Gait Training                                       Modalities          "   PROM prn CP 10' post

## 2024-03-05 ENCOUNTER — OFFICE VISIT (OUTPATIENT)
Dept: PHYSICAL THERAPY | Facility: CLINIC | Age: 64
End: 2024-03-05
Payer: COMMERCIAL

## 2024-03-05 DIAGNOSIS — M75.102 TEAR OF LEFT SUPRASPINATUS TENDON: Primary | ICD-10-CM

## 2024-03-05 LAB — GLUCOSE SERPL-MCNC: 110 MG/DL (ref 65–140)

## 2024-03-05 PROCEDURE — 97140 MANUAL THERAPY 1/> REGIONS: CPT

## 2024-03-05 PROCEDURE — 97110 THERAPEUTIC EXERCISES: CPT

## 2024-03-05 PROCEDURE — 97112 NEUROMUSCULAR REEDUCATION: CPT

## 2024-03-05 NOTE — PROGRESS NOTES
"Daily Note     Today's date: 3/5/2024  Patient name: Ralph Leyva  : 1960  MRN: [id number]  Referring provider: Sergio Reid*  Dx:   Encounter Diagnosis     ICD-10-CM    1. Tear of left supraspinatus tendon  M75.102                      Subjective: Pt reports he is doing well.      Objective: See treatment diary below      Assessment: Tolerated treatment well. Pt performed all exercises without issue, continue to progress to tolerance within protocol. Pt tolerated PROM well, continue to work towards increasing ROM within protocol. Patient demonstrated fatigue post treatment, exhibited good technique with therapeutic exercises, and would benefit from continued PT      Plan: Continue per plan of care.      Precautions: s/p L shoulder arthroscopic RTC repair with biceps tenodesis on 24  As per MD (24): NWB LUE in sling x 6 weeks (remove sling on 24).  Can remove Pillow at any time as it's for comfort only.  For now, may start elbow/wrist/hand range of motion. Pendulums to tolerance   Protocol in office visit with MD on 24    Date 2/27 2/29 3/5          Visit # IE 2 3          FOTO IE             Re-eval IE              Manuals 2/27 2/29 3/5          L shoulder PROM nv HY KM                                                 Neuro Re-Ed 2/27 2/29 3/5          Scap retracts 5x3\" 2x10 3\" 2x10 3\"          Pendulums 30\" fwd,s/s 30\" fwd,s/s 30\" fwd, s/s                                                              Pt Ivan BOWSER (sling, HEP)            Ther Ex 2/27 2/29 3/5          Wrist flex AROM 10x  2x10 2x10          Wrist ext AROM 10x 2x10 2x10          Elbow flex/ext AROM 10x  2x10 2x10          Towel squeezes nv Red 20x5\" Red 20x5\"          Table slides hold 10x10\" pain free 10x10\"                                                 Ther Activity  2/29 3/5                                    Gait Training  2/29 3/5                                    Modalities 2/27 2/29 3/5        "   PROM prn CP 10' post CP 10' post

## 2024-03-07 ENCOUNTER — OFFICE VISIT (OUTPATIENT)
Dept: PHYSICAL THERAPY | Facility: CLINIC | Age: 64
End: 2024-03-07
Payer: COMMERCIAL

## 2024-03-07 DIAGNOSIS — M75.102 TEAR OF LEFT SUPRASPINATUS TENDON: Primary | ICD-10-CM

## 2024-03-07 PROCEDURE — 97112 NEUROMUSCULAR REEDUCATION: CPT

## 2024-03-07 PROCEDURE — 97110 THERAPEUTIC EXERCISES: CPT

## 2024-03-07 PROCEDURE — 97140 MANUAL THERAPY 1/> REGIONS: CPT

## 2024-03-07 NOTE — PROGRESS NOTES
"Daily Note     Today's date: 3/7/2024  Patient name: Ralph Leyva  : 1960  MRN: [id number]  Referring provider: Sergio Reid*  Dx:   Encounter Diagnosis     ICD-10-CM    1. Tear of left supraspinatus tendon  M75.102           Start Time: 1056  Stop Time: 1134  Total time in clinic (min): 38 minutes    Subjective: Pt stated that he is doing alright at this point and has been doing his exercises at home every day.       Objective: See treatment diary below      Assessment: Pt tolerated gentle passive table slides today but there was audible clicking from his L shoulder without pain. Pt was re educated on importance of avoiding active motion as best as he can at home to make sure surgical site heals correctly, pt verbally acknowledge education. Pt required mild to moderate verbal cueing to perform pendulums with proper form and to avoid active use of his shoulder. Pt tolerated manual L shoulder PROM with reported decrease in stiffness post manual treatment. Pt tolerated application of ice at end of session well with reported decrease in discomfort post modality. Pt would benefit from continued skilled PT to improve L shoulder ROM, mobility, flexibility, strength, and functional ability.     Plan: Continue per plan of care.  Progress treatment as tolerated.       Precautions: s/p L shoulder arthroscopic RTC repair with biceps tenodesis on 24  As per MD (24): NWB LUE in sling x 6 weeks (remove sling on 24).  Can remove Pillow at any time as it's for comfort only.  For now, may start elbow/wrist/hand range of motion. Pendulums to tolerance   Protocol in office visit with MD on 24    Date 2/27 2/29 3/5 3/7         Visit # IE 2 3 4         FOTO IE             Re-eval IE              Manuals 2/27 2/29 3/5 3/7         L shoulder PROM nv HY KM                                                 Neuro Re-Ed 2/27 2/29 3/5 3/7         Scap retracts 5x3\" 2x10 3\" 2x10 3\" 20x 3\"       " "  Pendulums 30\" fwd,s/s 30\" fwd,s/s 30\" fwd, s/s 1' fwd, s/s, circles c,cc                                                             Pt Edu DK (sling, HEP)   DK         Ther Ex 2/27 2/29 3/5 3/7         Wrist flex AROM 10x  2x10 2x10 2x10         Wrist ext AROM 10x 2x10 2x10 2x10         Elbow flex/ext AROM 10x  2x10 2x10 2x10         Towel squeezes nv Red 20x5\" Red 20x5\" 20x5\" Red         Table slides hold 10x10\" pain free 10x10\" 5x10\" pain free flex                                                Ther Activity  2/29 3/5                                    Gait Training  2/29 3/5                                    Modalities 2/27 2/29 3/5 3/7         ice prn CP 10' post CP 10' post 10' post                               "

## 2024-03-11 ENCOUNTER — APPOINTMENT (OUTPATIENT)
Dept: PHYSICAL THERAPY | Facility: CLINIC | Age: 64
End: 2024-03-11
Payer: COMMERCIAL

## 2024-03-12 ENCOUNTER — OFFICE VISIT (OUTPATIENT)
Dept: PHYSICAL THERAPY | Facility: CLINIC | Age: 64
End: 2024-03-12
Payer: COMMERCIAL

## 2024-03-12 DIAGNOSIS — M75.102 TEAR OF LEFT SUPRASPINATUS TENDON: Primary | ICD-10-CM

## 2024-03-12 PROCEDURE — 97140 MANUAL THERAPY 1/> REGIONS: CPT

## 2024-03-12 PROCEDURE — 97010 HOT OR COLD PACKS THERAPY: CPT

## 2024-03-12 PROCEDURE — 97110 THERAPEUTIC EXERCISES: CPT

## 2024-03-12 NOTE — PROGRESS NOTES
Daily Note     Today's date: 3/12/2024  Patient name: Ralph Leyva  : 1960  MRN: [id number]  Referring provider: Sergio Reid*  Dx:   Encounter Diagnosis     ICD-10-CM    1. Tear of left supraspinatus tendon  M75.102           Start Time: 1355  Stop Time: 1430  Total time in clinic (min): 35 minutes    Subjective: Pt stated that he has no new complaints of pain or discomfort since last visit.       Objective: See treatment diary below      Assessment: Pt required mild verbal cueing to perform pendulums with proper form to avoid active movement of the LUE. Pt was challenged appropriately with progression of resistance with  strength activity. Pt continues to perform elbow and wrist active exercises and L shoulder passive exercises as per MD protocol at this time. Pt was educated to continue to wear sling for next few weeks as per MD protocol and to avoid active movement, pt understood instruction. Pt tolerated manual L shoulder PROM with reported decrease in stiffness post manual treatment. Pt tolerated application of ice at end of session well with reported decrease in discomfort post modality. Pt would benefit from continued skilled PT to improve L shoulder mobility, PROM, scapular stability, strength, and functional ability.     Plan: Continue per plan of care.  Progress treatment as tolerated.       Precautions: s/p L shoulder arthroscopic RTC repair with biceps tenodesis on 24  As per MD (24): NWB LUE in sling x 6 weeks (remove sling on 24).  Can remove Pillow at any time as it's for comfort only.  For now, may start elbow/wrist/hand range of motion. Pendulums to tolerance   Protocol in office visit with MD on 24    Date 2/27 2/29 3/5 3/7 3/12        Visit # IE 2 3 4 5        FOTO IE             Re-eval IE              Manuals 2/27 2/29 3/5 3/7 3/12        L shoulder PROM nv HY KM DK DK                                               Neuro Re-Ed 2/27 2/29 3/5 3/7  "3/12        Scap retracts 5x3\" 2x10 3\" 2x10 3\" 20x 3\" 20x5\"        Pendulums 30\" fwd,s/s 30\" fwd,s/s 30\" fwd, s/s 1' fwd, s/s, circles c,cc 1' fwd, s/s, circles                                                            Pt Edu DK (sling, HEP)   DK DK        Ther Ex 2/27 2/29 3/5 3/7 3/12        Wrist flex AROM 10x  2x10 2x10 2x10 2x10        Wrist ext AROM 10x 2x10 2x10 2x10 2x10        Elbow flex/ext AROM 10x  2x10 2x10 2x10 2x10         Towel squeezes nv Red 20x5\" Red 20x5\" 20x5\" Red 20x5\" green        Table slides hold 10x10\" pain free 10x10\" 5x10\" pain free flex 10x10\" pain free flex                                               Ther Activity  2/29 3/5                                    Gait Training  2/29 3/5                                    Modalities 2/27 2/29 3/5 3/7 3/12        ice prn CP 10' post CP 10' post 10' post 10' post                                "

## 2024-03-14 ENCOUNTER — OFFICE VISIT (OUTPATIENT)
Dept: PHYSICAL THERAPY | Facility: CLINIC | Age: 64
End: 2024-03-14
Payer: COMMERCIAL

## 2024-03-14 DIAGNOSIS — M75.102 TEAR OF LEFT SUPRASPINATUS TENDON: Primary | ICD-10-CM

## 2024-03-14 PROCEDURE — 97110 THERAPEUTIC EXERCISES: CPT

## 2024-03-19 ENCOUNTER — OFFICE VISIT (OUTPATIENT)
Dept: PHYSICAL THERAPY | Facility: CLINIC | Age: 64
End: 2024-03-19
Payer: COMMERCIAL

## 2024-03-19 DIAGNOSIS — M75.102 TEAR OF LEFT SUPRASPINATUS TENDON: Primary | ICD-10-CM

## 2024-03-19 PROCEDURE — 97140 MANUAL THERAPY 1/> REGIONS: CPT

## 2024-03-19 PROCEDURE — 97110 THERAPEUTIC EXERCISES: CPT

## 2024-03-19 NOTE — PROGRESS NOTES
"Daily Note     Today's date: 3/19/2024  Patient name: Ralph Leyva  : 1960  MRN: [id number]  Referring provider: Sergio Reid*  Dx:   Encounter Diagnosis     ICD-10-CM    1. Tear of left supraspinatus tendon  M75.102           Start Time: 1610  Stop Time: 1710  Total time in clinic (min): 60 minutes    Subjective: Pt stated that he has no new complaints of pain or discomfort since last visit. Pt states he is feeling very good and thought the 4 week ursula would never come.       Objective: See treatment diary below      Assessment: Pt tolerated today's session well with no adverse symptoms. Pt performed new exercises as noted as per protocol. Pt continues to show ROM within protocol. Pt would benefit from continued skilled PT to improve L shoulder mobility, PROM, scapular stability, strength, and functional ability. Continue to progress as able.     Plan: Continue per plan of care.  Progress treatment as tolerated.       Precautions: s/p L shoulder arthroscopic RTC repair with biceps tenodesis on 24  As per MD (24): NWB LUE in sling x 6 weeks (remove sling on 24).  Can remove Pillow at any time as it's for comfort only.  For now, may start elbow/wrist/hand range of motion. Pendulums to tolerance   Protocol in office visit with MD on 24    Date  3/5 3/7 3/12 3/14 3/19      Visit # IE 2 3 4 5 6 7      FOTO IE             Re-eval IE              Manuals  3/5 3/7 3/12 3/14 3/19      L shoulder PROM nv HY KM DK DK HY HY                                             Neuro Re-Ed  3/5 3/7 3/12 3/14 3/19      Scap retracts 5x3\" 2x10 3\" 2x10 3\" 20x 3\" 20x5\" 20x5\" 20x5\"      Pendulums 30\" fwd,s/s 30\" fwd,s/s 30\" fwd, s/s 1' fwd, s/s, circles c,cc 1' fwd, s/s, circles 1' fwd, s/s, circles 1' fwd, s/s, circles                                                          Pt Edu DK (milla, HEP)   DK DK        Ther Ex 2/27 2/29 3/5 3/7 3/12 3/14 3/19      Wrist flex " "AROM 10x  2x10 2x10 2x10 2x10 2x10 2x10      Wrist ext AROM 10x 2x10 2x10 2x10 2x10 2x10 2x10      Elbow flex/ext AROM 10x  2x10 2x10 2x10 2x10  2x10 2x10      Towel squeezes nv Red 20x5\" Red 20x5\" 20x5\" Red 20x5\" green 20x5\" green 40x5\" green      Table slides hold 10x10\" pain free 10x10\" 5x10\" pain free flex 10x10\" pain free flex 10x10\" pain free flex 10x10\" pain free flex      Bike        6'      AAROM w cane       10x10\"                   Ther Activity  2/29 3/5   3/14 3/19                                Gait Training  2/29 3/5    3/19                                Modalities 2/27 2/29 3/5 3/7 3/12 3/14 3/19      ice prn CP 10' post CP 10' post 10' post 10' post 10' post  10' post                              "

## 2024-03-21 ENCOUNTER — OFFICE VISIT (OUTPATIENT)
Dept: PHYSICAL THERAPY | Facility: CLINIC | Age: 64
End: 2024-03-21
Payer: COMMERCIAL

## 2024-03-21 DIAGNOSIS — M75.102 TEAR OF LEFT SUPRASPINATUS TENDON: Primary | ICD-10-CM

## 2024-03-21 PROCEDURE — 97110 THERAPEUTIC EXERCISES: CPT

## 2024-03-21 PROCEDURE — 97140 MANUAL THERAPY 1/> REGIONS: CPT

## 2024-03-26 ENCOUNTER — OFFICE VISIT (OUTPATIENT)
Dept: PHYSICAL THERAPY | Facility: CLINIC | Age: 64
End: 2024-03-26
Payer: COMMERCIAL

## 2024-03-26 DIAGNOSIS — M75.102 TEAR OF LEFT SUPRASPINATUS TENDON: Primary | ICD-10-CM

## 2024-03-26 PROCEDURE — 97010 HOT OR COLD PACKS THERAPY: CPT

## 2024-03-26 PROCEDURE — 97110 THERAPEUTIC EXERCISES: CPT

## 2024-03-26 PROCEDURE — 97140 MANUAL THERAPY 1/> REGIONS: CPT

## 2024-03-26 NOTE — PROGRESS NOTES
Daily Note     Today's date: 3/26/2024  Patient name: Ralph Leyva  : 1960  MRN: [id number]  Referring provider: Sergio Reid*  Dx:   Encounter Diagnosis     ICD-10-CM    1. Tear of left supraspinatus tendon  M75.102           Start Time: 1425  Stop Time: 1505  Total time in clinic (min): 40 minutes    Subjective: Pt stated that he has a little bit of soreness on the L side of his shoulder but has been doing a lot better over the past week and sometimes forgets that he even had the shoulder repaired.       Objective: See treatment diary below      Assessment: Pt's passive range of motion is good within expected limits at this point in rehabilitation, pt responded well to trial of gentle AAROM activity on pulleys and required mild verbal cueing to perform activity with proper form and to avoid excessive ROM and active use of LUE. Pt was educated on proper sling use for the next week and educated that he can stop wearing the sling next Friday (24), pt understood education but stated that he has been taking it off periodically throughout his day to improve his comfort. Pt required verbal cueing to decrease ROM slightly as pt was going too far into flexion and using his shoulder slightly actively today. Pt tolerated manual L shoulder PROM with reported decrease in stiffness post manual treatment. Pt tolerated application of ice at end of session well with reported decrease in discomfort post modality. Pt would benefit from continued skilled PT to improve L shoulder ROM, flexibility, motion, and functional ability.     Plan: Continue per plan of care.  Progress treatment as tolerated.       Precautions: s/p L shoulder arthroscopic RTC repair with biceps tenodesis on 24  As per MD (24): NWB LUE in sling x 6 weeks (remove sling on 24).  Can remove Pillow at any time as it's for comfort only.  For now, may start elbow/wrist/hand range of motion. Pendulums to tolerance   Protocol  "in office visit with MD on 2/26/24    Date 2/27 2/29 3/5 3/7 3/12 3/14 3/19 3/21 3/26    Visit # IE 2 3 4 5 6 7 8 9    FOTO IE         done    Re-eval IE              Manuals 2/27 2/29 3/5 3/7 3/12 3/14 3/19 3/21 3/26    L shoulder PROM nv HY KM DK DK HY HY HY DK                                           Neuro Re-Ed 2/27 2/29 3/5 3/7 3/12 3/14 3/19 3/21 3/26    Scap retracts 5x3\" 2x10 3\" 2x10 3\" 20x 3\" 20x5\" 20x5\" 20x5\" 20x5\" 20x5\"    Pendulums 30\" fwd,s/s 30\" fwd,s/s 30\" fwd, s/s 1' fwd, s/s, circles c,cc 1' fwd, s/s, circles 1' fwd, s/s, circles 1' fwd, s/s, circles 1' fwd, s/s, circles 1'  fwd, s/s, circles                                                        Pt Edu DK (milla, HEP)   DK DK        Ther Ex 2/27 2/29 3/5 3/7 3/12 3/14 3/19 3/21 3/26    Wrist flex AROM 10x  2x10 2x10 2x10 2x10 2x10 2x10 2x10 2x10    Wrist ext AROM 10x 2x10 2x10 2x10 2x10 2x10 2x10 2x10 2x10    Elbow flex/ext AROM 10x  2x10 2x10 2x10 2x10  2x10 2x10 2x10 2x10    Towel squeezes nv Red 20x5\" Red 20x5\" 20x5\" Red 20x5\" green 20x5\" green 40x5\" green 40x5\" green 40x5\" blue    Table slides hold 10x10\" pain free 10x10\" 5x10\" pain free flex 10x10\" pain free flex 10x10\" pain free flex 10x10\" pain free flex 10x10\" pain free flex 20x5\" pain free flex    Bike        6' 6' 6'    AAROM w cane       10x10\" 10x10\" 10x10\" flex    pulleys         5' gentle    Ther Activity  2/29 3/5   3/14 3/19 3/21                               Gait Training  2/29 3/5    3/19 3/21                               Modalities 2/27 2/29 3/5 3/7 3/12 3/14 3/19 3/21 3/26    ice prn CP 10' post CP 10' post 10' post 10' post 10' post  10' post 10' post 10' post                              "

## 2024-03-28 ENCOUNTER — OFFICE VISIT (OUTPATIENT)
Dept: PHYSICAL THERAPY | Facility: CLINIC | Age: 64
End: 2024-03-28
Payer: COMMERCIAL

## 2024-03-28 DIAGNOSIS — M75.102 TEAR OF LEFT SUPRASPINATUS TENDON: Primary | ICD-10-CM

## 2024-03-28 PROCEDURE — 97140 MANUAL THERAPY 1/> REGIONS: CPT | Performed by: PHYSICAL THERAPIST

## 2024-03-28 PROCEDURE — 97110 THERAPEUTIC EXERCISES: CPT | Performed by: PHYSICAL THERAPIST

## 2024-03-28 NOTE — PROGRESS NOTES
"Daily Note     Today's date: 3/28/2024  Patient name: Ralph Leyva  : 1960  MRN: [id number]  Referring provider: Sergio Reid*  Dx:   Encounter Diagnosis     ICD-10-CM    1. Tear of left supraspinatus tendon  M75.102                      Subjective: patient offers no new complaints.      Objective: See treatment diary below      Assessment: Tolerated treatment well. Continues to demonstrate PROM within expected limits at this point in rehabilitation. He does well with pulleys and AAROM supine flexion, but requires cueing to slow down the movements and not force the range of motion. Pt tolerated manual L shoulder PROM with reported decrease in stiffness post manual treatment. Pt tolerated application of ice at end of session well with reported decrease in discomfort post modality. Pt would benefit from continued skilled PT to improve L shoulder ROM, flexibility, motion, and functional ability.       Plan: Continue per plan of care.  Progress treatment as tolerated.       Precautions: s/p L shoulder arthroscopic RTC repair with biceps tenodesis on 24  As per MD (24): NWB LUE in sling x 6 weeks (remove sling on 24).  Can remove Pillow at any time as it's for comfort only.  For now, may start elbow/wrist/hand range of motion. Pendulums to tolerance   Protocol in office visit with MD on 24    Date 2/27 2/29 3/5 3/7 3/12 3/14 3/19 3/21 3/26 3/28   Visit # IE 2 3 4 5 6 7 8 9 10   FOTO IE         done    Re-eval IE              Manuals 2/27 2/29 3/5 3/7 3/12 3/14 3/19 3/21 3/26 3/28   L shoulder PROM nv HY KM DK DK HY HY HY DK SK                                          Neuro Re-Ed  3/5 3/7 3/12 3/14 3/19 3/21 3/26 3/28   Scap retracts 5x3\" 2x10 3\" 2x10 3\" 20x 3\" 20x5\" 20x5\" 20x5\" 20x5\" 20x5\" 20x5\"   Pendulums 30\" fwd,s/s 30\" fwd,s/s 30\" fwd, s/s 1' fwd, s/s, circles c,cc 1' fwd, s/s, circles 1' fwd, s/s, circles 1' fwd, s/s, circles 1' fwd, s/s, circles 1'  fwd, s/s, " "circles 1'  fwd, s/s, circles                                                       Pt Edu DK (sling, HEP)   DK DK        Ther Ex 2/27 2/29 3/5 3/7 3/12 3/14 3/19 3/21 3/26 3/28   Wrist flex AROM 10x  2x10 2x10 2x10 2x10 2x10 2x10 2x10 2x10 2x10   Wrist ext AROM 10x 2x10 2x10 2x10 2x10 2x10 2x10 2x10 2x10 2x10   Elbow flex/ext AROM 10x  2x10 2x10 2x10 2x10  2x10 2x10 2x10 2x10 2x10   Towel squeezes nv Red 20x5\" Red 20x5\" 20x5\" Red 20x5\" green 20x5\" green 40x5\" green 40x5\" green 40x5\" blue 40x5\" blue   Table slides hold 10x10\" pain free 10x10\" 5x10\" pain free flex 10x10\" pain free flex 10x10\" pain free flex 10x10\" pain free flex 10x10\" pain free flex 20x5\" pain free flex 20x5\" pain free flex   Bike        6' 6' 6'    AAROM w cane       10x10\" 10x10\" 10x10\" flex 10x10\" flex   pulleys         5' gentle 5' gentle   Ther Activity  2/29 3/5   3/14 3/19 3/21                               Gait Training  2/29 3/5    3/19 3/21                               Modalities 2/27 2/29 3/5 3/7 3/12 3/14 3/19 3/21 3/26 3/28   ice prn CP 10' post CP 10' post 10' post 10' post 10' post  10' post 10' post 10' post 10' post                               "

## 2024-04-02 ENCOUNTER — OFFICE VISIT (OUTPATIENT)
Dept: PHYSICAL THERAPY | Facility: CLINIC | Age: 64
End: 2024-04-02
Payer: COMMERCIAL

## 2024-04-02 DIAGNOSIS — M75.102 TEAR OF LEFT SUPRASPINATUS TENDON: Primary | ICD-10-CM

## 2024-04-02 PROCEDURE — 97140 MANUAL THERAPY 1/> REGIONS: CPT

## 2024-04-02 PROCEDURE — 97110 THERAPEUTIC EXERCISES: CPT

## 2024-04-02 PROCEDURE — 97112 NEUROMUSCULAR REEDUCATION: CPT

## 2024-04-02 NOTE — PROGRESS NOTES
"Daily Note     Today's date: 2024  Patient name: Ralph Leyva  : 1960  MRN: [id number]  Referring provider: Sergio Reid*  Dx:   Encounter Diagnosis     ICD-10-CM    1. Tear of left supraspinatus tendon  M75.102           Start Time: 1415  Stop Time: 1505  Total time in clinic (min): 50 minutes    Subjective: Pt states he is feeling ok today, no c/o pain.      Objective: See treatment diary below      Assessment: Tolerated treatment well. Continues to demonstrate PROM within expected limits. Pt continues to work towards goals of increased shoulder function and increased ROM. Pt would benefit from continued skilled PT to improve L shoulder ROM, flexibility, motion, and functional ability. Continue to progress as able.       Plan: Continue per plan of care.  Progress treatment as tolerated.       Precautions: s/p L shoulder arthroscopic RTC repair with biceps tenodesis on 24  As per MD (24): NWB LUE in sling x 6 weeks (remove sling on 24).  Can remove Pillow at any time as it's for comfort only.  For now, may start elbow/wrist/hand range of motion. Pendulums to tolerance   Protocol in office visit with MD on 24    Date 4/2    3/12 3/14 3/19 3/21 3/26 3/28   Visit # 11    5 6 7 8 9 10   FOTO          done    Re-eval               Manuals 4/2    3/12 3/14 3/19 3/21 3/26 3/28   L shoulder PROM HY    DK HY HY HY DK SK                                          Neuro Re-Ed 4/2    3/12 3/14 3/19 3/21 3/26 3/28   Scap retracts 20x5\"    20x5\" 20x5\" 20x5\" 20x5\" 20x5\" 20x5\"   Pendulums 1'  fwd, s/s, circles    1' fwd, s/s, circles 1' fwd, s/s, circles 1' fwd, s/s, circles 1' fwd, s/s, circles 1'  fwd, s/s, circles 1'  fwd, s/s, circles                                                       Pt Edu     DK        Ther Ex 4/2    3/12 3/14 3/19 3/21 3/26 3/28   Wrist flex AROM 2# 2x10    2x10 2x10 2x10 2x10 2x10 2x10   Wrist ext AROM 2# 2x10    2x10 2x10 2x10 2x10 2x10 2x10   Elbow " "flex/ext AROM 2# 2x10    2x10  2x10 2x10 2x10 2x10 2x10   Towel squeezes 40x5\" blue    20x5\" green 20x5\" green 40x5\" green 40x5\" green 40x5\" blue 40x5\" blue   Table slides 20x5\" pain free flex    10x10\" pain free flex 10x10\" pain free flex 10x10\" pain free flex 10x10\" pain free flex 20x5\" pain free flex 20x5\" pain free flex   Bike  D/c      6' 6' 6'    AAROM w cane 10x10\" flex      10x10\" 10x10\" 10x10\" flex 10x10\" flex   pulleys 5' gentle        5' gentle 5' gentle   Ther Activity 4/2     3/14 3/19 3/21                               Gait Training 4/2      3/19 3/21                               Modalities 4/2    3/12 3/14 3/19 3/21 3/26 3/28   ice     10' post 10' post  10' post 10' post 10' post 10' post                               "

## 2024-04-04 ENCOUNTER — OFFICE VISIT (OUTPATIENT)
Dept: PHYSICAL THERAPY | Facility: CLINIC | Age: 64
End: 2024-04-04
Payer: COMMERCIAL

## 2024-04-04 DIAGNOSIS — M75.102 TEAR OF LEFT SUPRASPINATUS TENDON: Primary | ICD-10-CM

## 2024-04-04 PROCEDURE — 97110 THERAPEUTIC EXERCISES: CPT

## 2024-04-04 PROCEDURE — 97140 MANUAL THERAPY 1/> REGIONS: CPT

## 2024-04-04 PROCEDURE — 97112 NEUROMUSCULAR REEDUCATION: CPT

## 2024-04-04 NOTE — PROGRESS NOTES
"Daily Note     Today's date: 2024  Patient name: Ralph Leyva  : 1960  MRN: [id number]  Referring provider: Sergio Reid*  Dx:   Encounter Diagnosis     ICD-10-CM    1. Tear of left supraspinatus tendon  M75.102           Start Time: 1405  Stop Time: 1500  Total time in clinic (min): 55 minutes    Subjective: Pt states he is feeling ok today, no c/o pain.      Objective: See treatment diary below      Assessment: Tolerated treatment well. Pt continues to show improved ROM as appropriate. Pt performed increased weight with no adverse symptoms on wrist exercises. Pt would benefit from continued skilled PT to improve L shoulder ROM, flexibility, motion, and functional ability. Continue to progress as able. Pt educated on removal of sling as per tolerance and weaning schedule.       Plan: Continue per plan of care.  Progress treatment as tolerated.       Precautions: s/p L shoulder arthroscopic RTC repair with biceps tenodesis on 24  As per MD (24): NWB LUE in sling x 6 weeks (remove sling on 24).  Can remove Pillow at any time as it's for comfort only.  For now, may start elbow/wrist/hand range of motion. Pendulums to tolerance   Protocol in office visit with MD on 24    Date 4/2 4/4   3/12 3/14 3/19 3/21 3/26 3/28   Visit # 11 12   5 6 7 8 9 10   FOTO          done    Re-eval               Manuals 4/2 4/4   3/12 3/14 3/19 3/21 3/26 3/28   L shoulder PROM HY HY   DK HY HY HY DK SK                                          Neuro Re-Ed 4/2 4/4   3/12 3/14 3/19 3/21 3/26 3/28   Scap retracts 20x5\" 20x5\"   20x5\" 20x5\" 20x5\" 20x5\" 20x5\" 20x5\"   Pendulums 1'  fwd, s/s, circles 1'  fwd, s/s, circles   1' fwd, s/s, circles 1' fwd, s/s, circles 1' fwd, s/s, circles 1' fwd, s/s, circles 1'  fwd, s/s, circles 1'  fwd, s/s, circles                                                       Pt Edu     DK        Ther Ex 4/2 4/4   3/12 3/14 3/19 3/21 3/26 3/28   Wrist flex AROM 2# 2x10 2# " "2x10   2x10 2x10 2x10 2x10 2x10 2x10   Wrist ext AROM 2# 2x10 2# 2x10   2x10 2x10 2x10 2x10 2x10 2x10   Elbow flex/ext AROM 2# 2x10 2# 2x10   2x10  2x10 2x10 2x10 2x10 2x10   Towel squeezes 40x5\" blue 40x5\" blue   20x5\" green 20x5\" green 40x5\" green 40x5\" green 40x5\" blue 40x5\" blue   Table slides 20x5\" pain free flex 20x5\" pain free flex   10x10\" pain free flex 10x10\" pain free flex 10x10\" pain free flex 10x10\" pain free flex 20x5\" pain free flex 20x5\" pain free flex   Bike  D/c --     6' 6' 6'    AAROM w cane 10x10\" flex 10x10\" flex     10x10\" 10x10\" 10x10\" flex 10x10\" flex   pulleys 5' gentle 5' gentle       5' gentle 5' gentle   Ther Activity 4/2 4/4    3/14 3/19 3/21                               Gait Training 4/2 4/4     3/19 3/21                               Modalities 4/2 4/4   3/12 3/14 3/19 3/21 3/26 3/28   ice     10' post 10' post  10' post 10' post 10' post 10' post                               "

## 2024-04-09 ENCOUNTER — OFFICE VISIT (OUTPATIENT)
Dept: PHYSICAL THERAPY | Facility: CLINIC | Age: 64
End: 2024-04-09
Payer: COMMERCIAL

## 2024-04-09 DIAGNOSIS — M75.102 TEAR OF LEFT SUPRASPINATUS TENDON: Primary | ICD-10-CM

## 2024-04-09 PROCEDURE — 97110 THERAPEUTIC EXERCISES: CPT

## 2024-04-09 PROCEDURE — 97010 HOT OR COLD PACKS THERAPY: CPT

## 2024-04-09 PROCEDURE — 97140 MANUAL THERAPY 1/> REGIONS: CPT

## 2024-04-09 NOTE — PROGRESS NOTES
Daily Note     Today's date: 2024  Patient name: Ralph Leyva  : 1960  MRN: [id number]  Referring provider: Sergio Reid*  Dx:   Encounter Diagnosis     ICD-10-CM    1. Tear of left supraspinatus tendon  M75.102           Start Time: 1430  Stop Time: 1520  Total time in clinic (min): 50 minutes    Subjective: Pt stated that he has been doing well since taking the sling off last week. Pt stated that he can tell that his L shoulder is week when trying to actively move it and gets some cramps sometimes but is otherwise good.       Objective: See treatment diary below      Assessment: Pt is now 6 weeks post op and will start to gradually add gentle AROM activities to pt tolerance as per MD protocol. Pt tolerated warm up on pulleys well at beginning of session without increase in discomfort during or after activity. Pt was challenged appropriately with addition of L shoulder active flexion raises to ~80 degrees and abduction raises to ~40 degrees. Pt was educated on proper form and performance of deltoid isometrics activity and to get the musculature firing which pt adapted to well with mild soreness post performance but no discomfort during or after activity. Pt required verbal cueing throughout session to perform activities slowly through available, pain-free motion as pt has tendency to push into ROM to feel stretching more. Pt tolerated manual L shoulder PROM with reported decrease in stiffness post manual treatment. Pt tolerated application of ice at end of session well with reported decrease in discomfort post modality. Pt would benefit from continued skilled PT to improve L shoulder mobility, ROM, flexibility, and functional ability.     Plan: Continue per plan of care.  Progress treatment as tolerated.       Precautions: s/p L shoulder arthroscopic RTC repair with biceps tenodesis on 24  As per MD (24): NWB LUE in sling x 6 weeks (remove sling on 24).  Can remove Pillow  "at any time as it's for comfort only.  For now, may start elbow/wrist/hand range of motion. Pendulums to tolerance   Protocol in office visit with MD on 2/26/24  NO STRENGTHENING UNTIL WEEK 12  Isometrics ok, no more than coffee cup weight (2#) until 12 weeks.    Date 4/2 4/4 4/9          Visit # 11 12 13          FOTO   nv          Re-eval               Manuals 4/2 4/4 4/9  3/12 3/14 3/19 3/21 3/26 3/28   L shoulder PROM HY HY DK  DK HY HY HY DK SK                                          Neuro Re-Ed 4/2 4/4 4/9  3/12 3/14 3/19 3/21 3/26 3/28   Scap retracts 20x5\" 20x5\" 20x5\"  20x5\" 20x5\" 20x5\" 20x5\" 20x5\" 20x5\"   Pendulums 1'  fwd, s/s, circles 1'  fwd, s/s, circles 1' fwd, s/s, circles  1' fwd, s/s, circles 1' fwd, s/s, circles 1' fwd, s/s, circles 1' fwd, s/s, circles 1'  fwd, s/s, circles 1'  fwd, s/s, circles                                                       Pt Edu   DK  DK        Ther Ex 4/2 4/4 4/9  3/12 3/14 3/19 3/21 3/26 3/28   Wrist flex AROM 2# 2x10 2# 2x10   2x10 2x10 2x10 2x10 2x10 2x10   Wrist ext AROM 2# 2x10 2# 2x10   2x10 2x10 2x10 2x10 2x10 2x10   Elbow flex/ext AROM 2# 2x10 2# 2x10   2x10  2x10 2x10 2x10 2x10 2x10   Towel squeezes 40x5\" blue 40x5\" blue 30x5\" black   20x5\" green 20x5\" green 40x5\" green 40x5\" green 40x5\" blue 40x5\" blue   Table slides 20x5\" pain free flex 20x5\" pain free flex 20x5\" pain free flex   10x10\" pain free flex 10x10\" pain free flex 10x10\" pain free flex 10x10\" pain free flex 20x5\" pain free flex 20x5\" pain free flex   Shoulder raises   2x10 flex ~80 deg, 2x10 abd ~40 deg          Deltoid isometrics   2x10 2\" flex, abd, ER, ext          Finger ladder   10x10\" flex          AAROM w cane 10x10\" flex 10x10\" flex 2x10 flex    10x10\" 10x10\" 10x10\" flex 10x10\" flex   pulleys 5' gentle 5' gentle 5' pulleys      5' gentle 5' gentle   Ther Activity 4/2 4/4    3/14 3/19 3/21                               Gait Training 4/2 4/4     3/19 3/21                             "   Modalities 4/2 4/4 4/9  3/12 3/14 3/19 3/21 3/26 3/28   ice   10' post  10' post 10' post  10' post 10' post 10' post 10' post

## 2024-04-11 ENCOUNTER — OFFICE VISIT (OUTPATIENT)
Dept: PHYSICAL THERAPY | Facility: CLINIC | Age: 64
End: 2024-04-11
Payer: COMMERCIAL

## 2024-04-11 DIAGNOSIS — M75.102 TEAR OF LEFT SUPRASPINATUS TENDON: Primary | ICD-10-CM

## 2024-04-11 PROCEDURE — 97110 THERAPEUTIC EXERCISES: CPT

## 2024-04-11 PROCEDURE — 97140 MANUAL THERAPY 1/> REGIONS: CPT

## 2024-04-11 NOTE — PROGRESS NOTES
Daily Note     Today's date: 2024  Patient name: Ralph Leyva  : 1960  MRN: [id number]  Referring provider: Sergio Reid*  Dx:   Encounter Diagnosis     ICD-10-CM    1. Tear of left supraspinatus tendon  M75.102           Start Time: 1000  Stop Time: 1046  Total time in clinic (min): 46 minutes    Subjective: Pt stated that his shoulder is feeling good at start of session but has some low back pain that is bothering him.       Objective: See treatment diary below      Assessment: Pt tolerated warm up on pulleys well at beginning of session without increase in discomfort during or after activity. Pt had slight improvement in L active shoulder flexion ROM today but continues to be limited in L shoulder abduction. Pt required mild verbal cueing to perform deltoid isometrics with proper form, pt adapted to instruction well. Pt performed mobility and stretching AROM/AAROM activities well with mild soreness post session but good improvement in stiffness. Pt tolerated manual L shoulder PROM and stretching with reported decrease in stiffness post manual treatment. Pt was educated throughout session to slow down activities and control them to avoid stressing the structures further, pt had slight difficulty adapting to instruction. Pt tolerated application of ice at end of session well with reported decrease in discomfort post modality. Pt would benefit from continued skilled PT to improve L shoulder mobility, flexibility, ROM, scapular mobility, and functional ability.      Plan: Continue per plan of care.  Progress treatment as tolerated.       Precautions: s/p L shoulder arthroscopic RTC repair with biceps tenodesis on 24  As per MD (24): NWB LUE in sling x 6 weeks (remove sling on 24).  Can remove Pillow at any time as it's for comfort only.  For now, may start elbow/wrist/hand range of motion. Pendulums to tolerance   Protocol in office visit with MD on 24  NO  "STRENGTHENING UNTIL WEEK 12  Isometrics ok, no more than coffee cup weight (2#) until 12 weeks.    Date 4/2 4/4 4/9 4/11         Visit # 11 12 13 14         FOTO   nv done         Re-eval               Manuals 4/2 4/4 4/9 4/11         L shoulder PROM HY HY DK DK                                                Neuro Re-Ed 4/2 4/4 4/9 4/11         Scap retracts 20x5\" 20x5\" 20x5\" 20x5\"         Pendulums 1'  fwd, s/s, circles 1'  fwd, s/s, circles 1' fwd, s/s, circles 1' fwd, s/s, circles                                                             Pt Edu   DK DK         Ther Ex 4/2 4/4 4/9 4/11         Wrist flex AROM 2# 2x10 2# 2x10           Wrist ext AROM 2# 2x10 2# 2x10           Elbow flex/ext AROM 2# 2x10 2# 2x10           Towel squeezes 40x5\" blue 40x5\" blue 30x5\" black  30x5\" blback          Table slides 20x5\" pain free flex 20x5\" pain free flex 20x5\" pain free flex  20x5\" pain free flex         Shoulder raises   2x10 flex ~80 deg, 2x10 abd ~40 deg 2x10 flex ~90 degrees, 2x10 abd ~40 degrees         Deltoid isometrics   2x10 2\" flex, abd, ER, ext 2x10 3\" flex, abd, ER, ext         Wall slides             Finger ladder   10x10\" flex 10x10\" flex         AAROM w cane 10x10\" flex 10x10\" flex 2x10 flex 2x10 abd stand, 2x10 flex         pulleys 5' gentle 5' gentle 5' pulleys 5'         Ther Activity 4/2 4/4                                     Gait Training 4/2 4/4                                     Modalities 4/2 4/4 4/9 4/11         ice   10' post 8' post                                         "

## 2024-04-16 ENCOUNTER — OFFICE VISIT (OUTPATIENT)
Dept: PHYSICAL THERAPY | Facility: CLINIC | Age: 64
End: 2024-04-16
Payer: COMMERCIAL

## 2024-04-16 DIAGNOSIS — M75.102 TEAR OF LEFT SUPRASPINATUS TENDON: Primary | ICD-10-CM

## 2024-04-16 PROCEDURE — 97110 THERAPEUTIC EXERCISES: CPT

## 2024-04-16 PROCEDURE — 97140 MANUAL THERAPY 1/> REGIONS: CPT

## 2024-04-16 NOTE — PROGRESS NOTES
"Daily Note     Today's date: 2024  Patient name: Ralph Leyva  : 1960  MRN: [id number]  Referring provider: Sergio Reid*  Dx:   Encounter Diagnosis     ICD-10-CM    1. Tear of left supraspinatus tendon  M75.102           Start Time: 1415  Stop Time: 1515  Total time in clinic (min): 60 minutes    Subjective: Pt stated that his shoulder is feeling good today, no new c/o pain or symptoms.        Objective: See treatment diary below      Assessment: Pt tolerated warm up on pulleys well at beginning of session without increase in discomfort during or after activity. Pt continues to work towards goals of increased UE AROM and endurance. Pt shows ROM WNL with manual therapy. Pt would benefit from continued skilled PT to improve L shoulder mobility, flexibility, ROM, scapular mobility, and functional ability. Continue to progress as able.       Plan: Continue per plan of care.  Progress treatment as tolerated.       Precautions: s/p L shoulder arthroscopic RTC repair with biceps tenodesis on 24  As per MD (24): NWB LUE in sling x 6 weeks (remove sling on 24).  Can remove Pillow at any time as it's for comfort only.  For now, may start elbow/wrist/hand range of motion. Pendulums to tolerance   Protocol in office visit with MD on 24  NO STRENGTHENING UNTIL WEEK 12  Isometrics ok, no more than coffee cup weight (2#) until 12 weeks.    Date         Visit # 11 12 13 14 15        FOTO   nv done         Re-eval               Manuals         L shoulder PROM HY HY DK DK HY                                               Neuro Re-Ed         Scap retracts 20x5\" 20x5\" 20x5\" 20x5\" 20x5\"        Pendulums 1'  fwd, s/s, circles 1'  fwd, s/s, circles 1' fwd, s/s, circles 1' fwd, s/s, circles 1' fwd, s/s, circles                                                            Pt Edu   DK DK         Ther Ex       " "  Wrist flex AROM 2# 2x10 2# 2x10           Wrist ext AROM 2# 2x10 2# 2x10           Elbow flex/ext AROM 2# 2x10 2# 2x10           Towel squeezes 40x5\" blue 40x5\" blue 30x5\" black  30x5\" blback  30x5\" black         Table slides 20x5\" pain free flex 20x5\" pain free flex 20x5\" pain free flex  20x5\" pain free flex 20x5\" pain free flex        Shoulder raises   2x10 flex ~80 deg, 2x10 abd ~40 deg 2x10 flex ~90 degrees, 2x10 abd ~40 degrees 2x10 flex ~90 degrees, 2x10 abd ~40 degrees        Deltoid isometrics   2x10 2\" flex, abd, ER, ext 2x10 3\" flex, abd, ER, ext 2x10 3\" flex, abd, ER, ext        Wall slides             Finger ladder   10x10\" flex 10x10\" flex 10x10\" flex        AAROM w cane 10x10\" flex 10x10\" flex 2x10 flex 2x10 abd stand, 2x10 flex 2x10 abd stand, 2x10 flex        pulleys 5' gentle 5' gentle 5' pulleys 5' 5'        Ther Activity 4/2 4/4 4/16                                  Gait Training 4/2 4/4                                     Modalities 4/2 4/4 4/9 4/11 4/16        ice   10' post 8' post 10' post                                         "

## 2024-04-18 ENCOUNTER — OFFICE VISIT (OUTPATIENT)
Dept: PHYSICAL THERAPY | Facility: CLINIC | Age: 64
End: 2024-04-18
Payer: COMMERCIAL

## 2024-04-18 DIAGNOSIS — M75.102 TEAR OF LEFT SUPRASPINATUS TENDON: Primary | ICD-10-CM

## 2024-04-18 PROCEDURE — 97110 THERAPEUTIC EXERCISES: CPT

## 2024-04-18 NOTE — PROGRESS NOTES
"Daily Note     Today's date: 2024  Patient name: Ralph Leyva  : 1960  MRN: [id number]  Referring provider: Sergio Reid*  Dx:   Encounter Diagnosis     ICD-10-CM    1. Tear of left supraspinatus tendon  M75.102           Start Time: 1400  Stop Time: 1440  Total time in clinic (min): 40 minutes    Subjective: Pt stated that his shoulder is feeling good today, no new c/o pain or symptoms.        Objective: See treatment diary below      Assessment: Pt tolerated today's session well with no adverse symptoms. Pt needed minimal VCing for count of exercises, but performed with good form.  Pt would benefit from continued skilled PT to improve L shoulder mobility, flexibility, ROM, scapular mobility, and functional ability. Continue to progress as able.       Plan: Continue per plan of care.  Progress treatment as tolerated.       Precautions: s/p L shoulder arthroscopic RTC repair with biceps tenodesis on 24  As per MD (24): NWB LUE in sling x 6 weeks (remove sling on 24).  Can remove Pillow at any time as it's for comfort only.  For now, may start elbow/wrist/hand range of motion. Pendulums to tolerance   Protocol in office visit with MD on 24  NO STRENGTHENING UNTIL WEEK 12  Isometrics ok, no more than coffee cup weight (2#) until 12 weeks.    Date        Visit # 11 12 13 14 15 16       FOTO   nv done         Re-eval               Manuals        L shoulder PROM HY HY DK DK HY HY                                              Neuro Re-Ed        Scap retracts 20x5\" 20x5\" 20x5\" 20x5\" 20x5\" 20x5\"       Pendulums 1'  fwd, s/s, circles 1'  fwd, s/s, circles 1' fwd, s/s, circles 1' fwd, s/s, circles 1' fwd, s/s, circles 1' fwd, s/s, circles                                                           Pt Edu   DK DK         Ther Ex        Wrist flex AROM 2# 2x10 2# 2x10         " "  Wrist ext AROM 2# 2x10 2# 2x10           Elbow flex/ext AROM 2# 2x10 2# 2x10           Towel squeezes 40x5\" blue 40x5\" blue 30x5\" black  30x5\" blback  30x5\" black  30x5\" black        Table slides 20x5\" pain free flex 20x5\" pain free flex 20x5\" pain free flex  20x5\" pain free flex 20x5\" pain free flex 20x5\" pain free flex       Shoulder raises   2x10 flex ~80 deg, 2x10 abd ~40 deg 2x10 flex ~90 degrees, 2x10 abd ~40 degrees 2x10 flex ~90 degrees, 2x10 abd ~40 degrees 2x10 flex ~90 degrees, 2x10 abd ~40 degrees       Deltoid isometrics   2x10 2\" flex, abd, ER, ext 2x10 3\" flex, abd, ER, ext 2x10 3\" flex, abd, ER, ext 2x10 3\" flex, abd, ER, ext       Wall slides             Finger ladder   10x10\" flex 10x10\" flex 10x10\" flex 10x10\" flex       AAROM w cane 10x10\" flex 10x10\" flex 2x10 flex 2x10 abd stand, 2x10 flex 2x10 abd stand, 2x10 flex 2x10 abd stand, 2x10 flex       pulleys 5' gentle 5' gentle 5' pulleys 5' 5' 5'       Ther Activity 4/2 4/4 4/16 4/18                                 Gait Training 4/2 4/4                                     Modalities 4/2 4/4 4/9 4/11 4/16 4/18       ice   10' post 8' post 10' post  10' post                                        "

## 2024-04-23 ENCOUNTER — APPOINTMENT (OUTPATIENT)
Dept: PHYSICAL THERAPY | Facility: CLINIC | Age: 64
End: 2024-04-23
Payer: COMMERCIAL

## 2024-04-23 ENCOUNTER — OFFICE VISIT (OUTPATIENT)
Dept: PHYSICAL THERAPY | Facility: CLINIC | Age: 64
End: 2024-04-23
Payer: COMMERCIAL

## 2024-04-23 DIAGNOSIS — M75.102 TEAR OF LEFT SUPRASPINATUS TENDON: Primary | ICD-10-CM

## 2024-04-23 PROCEDURE — 97140 MANUAL THERAPY 1/> REGIONS: CPT

## 2024-04-23 PROCEDURE — 97110 THERAPEUTIC EXERCISES: CPT

## 2024-04-23 NOTE — PROGRESS NOTES
"Daily Note     Today's date: 2024  Patient name: Ralph Leyva  : 1960  MRN: [id number]  Referring provider: Sergio Reid*  Dx:   Encounter Diagnosis     ICD-10-CM    1. Tear of left supraspinatus tendon  M75.102                      Subjective: Pt reports he is doing ok, he was supposed to have surgery today but it was cancelled due to a medication issue.       Objective: See treatment diary below      Assessment: Tolerated treatment well. Pt performed all exercises without issue, continue to progress to tolerance. Pt would benefit from continued focus on stretching and ROM exercises to decrease pain and increase ROM and overall function. Patient demonstrated fatigue post treatment, exhibited good technique with therapeutic exercises, and would benefit from continued PT      Plan: Continue per plan of care.      Precautions: s/p L shoulder arthroscopic RTC repair with biceps tenodesis on 24  As per MD (24): NWB LUE in sling x 6 weeks (remove sling on 24).  Can remove Pillow at any time as it's for comfort only.  For now, may start elbow/wrist/hand range of motion. Pendulums to tolerance   Protocol in office visit with MD on 24  NO STRENGTHENING UNTIL WEEK 12  Isometrics ok, no more than coffee cup weight (2#) until 12 weeks.    Date       Visit # 11 12 13 14 15 16 17      FOTO   nv done         Re-eval               Manuals       L shoulder PROM HY HY DK DK HY HY KM                                             Neuro Re-Ed        Scap retracts 20x5\" 20x5\" 20x5\" 20x5\" 20x5\" 20x5\" 20x5\"      Pendulums 1'  fwd, s/s, circles 1'  fwd, s/s, circles 1' fwd, s/s, circles 1' fwd, s/s, circles 1' fwd, s/s, circles 1' fwd, s/s, circles 1' fwd, s/s, circles                                                          Pt Edu   DK DK         Ther Ex        Wrist flex AROM 2# " "2x10 2# 2x10           Wrist ext AROM 2# 2x10 2# 2x10           Elbow flex/ext AROM 2# 2x10 2# 2x10           Towel squeezes 40x5\" blue 40x5\" blue 30x5\" black  30x5\" blback  30x5\" black  30x5\" black        Table slides 20x5\" pain free flex 20x5\" pain free flex 20x5\" pain free flex  20x5\" pain free flex 20x5\" pain free flex 20x5\" pain free flex 20x5\" pain free flex      Shoulder raises   2x10 flex ~80 deg, 2x10 abd ~40 deg 2x10 flex ~90 degrees, 2x10 abd ~40 degrees 2x10 flex ~90 degrees, 2x10 abd ~40 degrees 2x10 flex ~90 degrees, 2x10 abd ~40 degrees 2x10 flex ~90 degrees, 2x10 abd ~40 degrees      Deltoid isometrics   2x10 2\" flex, abd, ER, ext 2x10 3\" flex, abd, ER, ext 2x10 3\" flex, abd, ER, ext 2x10 3\" flex, abd, ER, ext 2x10 3\" flex, abd, ER, ext      Wall slides             Finger ladder   10x10\" flex 10x10\" flex 10x10\" flex 10x10\" flex 10x10\" flex      AAROM w cane 10x10\" flex 10x10\" flex 2x10 flex 2x10 abd stand, 2x10 flex 2x10 abd stand, 2x10 flex 2x10 abd stand, 2x10 flex 2x10 abd stand, 2x10 flex      pulleys 5' gentle 5' gentle 5' pulleys 5' 5' 5' 5'      Ther Activity 4/2 4/4 4/16 4/18                                 Gait Training 4/2 4/4                                     Modalities 4/2 4/4 4/9 4/11 4/16 4/18       ice   10' post 8' post 10' post  10' post  10' post                                        "

## 2024-04-25 ENCOUNTER — OFFICE VISIT (OUTPATIENT)
Dept: OBGYN CLINIC | Facility: OTHER | Age: 64
End: 2024-04-25

## 2024-04-25 ENCOUNTER — OFFICE VISIT (OUTPATIENT)
Dept: PHYSICAL THERAPY | Facility: CLINIC | Age: 64
End: 2024-04-25
Payer: COMMERCIAL

## 2024-04-25 VITALS
HEART RATE: 65 BPM | DIASTOLIC BLOOD PRESSURE: 80 MMHG | BODY MASS INDEX: 39.99 KG/M2 | SYSTOLIC BLOOD PRESSURE: 127 MMHG | WEIGHT: 270 LBS | HEIGHT: 69 IN

## 2024-04-25 DIAGNOSIS — Z98.890 S/P LEFT ROTATOR CUFF REPAIR: Primary | ICD-10-CM

## 2024-04-25 DIAGNOSIS — M75.102 TEAR OF LEFT SUPRASPINATUS TENDON: Primary | ICD-10-CM

## 2024-04-25 PROCEDURE — 99024 POSTOP FOLLOW-UP VISIT: CPT | Performed by: PHYSICIAN ASSISTANT

## 2024-04-25 PROCEDURE — 97140 MANUAL THERAPY 1/> REGIONS: CPT

## 2024-04-25 PROCEDURE — 97110 THERAPEUTIC EXERCISES: CPT

## 2024-04-25 RX ORDER — SULFAMETHOXAZOLE AND TRIMETHOPRIM 800; 160 MG/1; MG/1
TABLET ORAL
COMMUNITY
Start: 2024-04-11

## 2024-04-25 RX ORDER — SEMAGLUTIDE 0.68 MG/ML
0.25 INJECTION, SOLUTION SUBCUTANEOUS WEEKLY
COMMUNITY
Start: 2024-04-22

## 2024-04-25 RX ORDER — ACETAMINOPHEN 500 MG
1000 TABLET ORAL EVERY 6 HOURS PRN
COMMUNITY

## 2024-04-25 NOTE — PROGRESS NOTES
"Surgery: SALS w/revision [facility] on 2/23/2024    S: Patient is doing well.  They have been compliant with formal PT and the HEP.  Denies new injury or trauma.  Please with progress.  Denies any pain in the left shoulder but admits to neck spasms.    /80 (BP Location: Right arm, Patient Position: Sitting, Cuff Size: Adult)   Pulse 65   Ht 5' 9\" (1.753 m)   Wt 122 kg (270 lb)   BMI 39.87 kg/m²     O: L shoulder  FF: equivalent  Abd: equivalent  ER: equivalent  IR: equivalent  ER strength: Good resistance  Good elbow, wrist and hand range of motion  Skin - warm and dry  SI  NVI    A/P: 9 weeks following arthroscopic left shoulder rotator cuff repair (revision) with bicep tenodesis  Continue with formal physical therapy - following standard protocol  HEP - per therapy.    Pain control - Tylenol 1000 mg every 8 hours  Ice as needed - 20 minutes on and 20 minutes off  Follow up in 6-8 weeks     "

## 2024-04-25 NOTE — PROGRESS NOTES
"Daily Note     Today's date: 2024  Patient name: Ralph Leyva  : 1960  MRN: [id number]  Referring provider: Sergio Reid*  Dx:   Encounter Diagnosis     ICD-10-CM    1. Tear of left supraspinatus tendon  M75.102                      Subjective: Patient states he is not having a whole lot of pain, only when lifting arm out to the side. States he is seeing his doctor for a check up today.     Objective: See treatment diary below      Assessment: Tolerated treatment well.  Reassessment and L shoulder measurements taken today by PT. Flexion - 155 degrees, Abduction- 95 degrees, ER- 55 degrees, IT - 60 degrees. Patient shows good compliance to therapy and continues to benefit from therapy.       Plan: Continue per plan of care.  Wait for doctor results.      Precautions: s/p L shoulder arthroscopic RTC repair with biceps tenodesis on 24  As per MD (24): NWB LUE in sling x 6 weeks (remove sling on 24).  Can remove Pillow at any time as it's for comfort only.  For now, may start elbow/wrist/hand range of motion. Pendulums to tolerance   Protocol in office visit with MD on 24  NO STRENGTHENING UNTIL WEEK 12  Isometrics ok, no more than coffee cup weight (2#) until 12 weeks.    Date      Visit # 11 12 13 14 15 16 17 18     FOTO   nv done         Re-eval               Manuals      L shoulder PROM HY HY DK DK HY HY KM GFx 10 min                                             Neuro Re-Ed        Scap retracts 20x5\" 20x5\" 20x5\" 20x5\" 20x5\" 20x5\" 20x5\" 20 x 5\"      Pendulums 1'  fwd, s/s, circles 1'  fwd, s/s, circles 1' fwd, s/s, circles 1' fwd, s/s, circles 1' fwd, s/s, circles 1' fwd, s/s, circles 1' fwd, s/s, circles 1' fwd, s/s, circles                                                          Pt Edu   DK DK         Ther Ex      Wrist flex AROM 2# 2x10 " "2# 2x10           Wrist ext AROM 2# 2x10 2# 2x10           Elbow flex/ext AROM 2# 2x10 2# 2x10           Towel squeezes 40x5\" blue 40x5\" blue 30x5\" black  30x5\" blback  30x5\" black  30x5\" black   Gripper - black 5\" x 30      Table slides 20x5\" pain free flex 20x5\" pain free flex 20x5\" pain free flex  20x5\" pain free flex 20x5\" pain free flex 20x5\" pain free flex 20x5\" pain free flex 20 x 5\" free flex      Shoulder raises   2x10 flex ~80 deg, 2x10 abd ~40 deg 2x10 flex ~90 degrees, 2x10 abd ~40 degrees 2x10 flex ~90 degrees, 2x10 abd ~40 degrees 2x10 flex ~90 degrees, 2x10 abd ~40 degrees 2x10 flex ~90 degrees, 2x10 abd ~40 degrees 2 x 10 flex to 90, 2 x10 abd - to 40      Deltoid isometrics   2x10 2\" flex, abd, ER, ext 2x10 3\" flex, abd, ER, ext 2x10 3\" flex, abd, ER, ext 2x10 3\" flex, abd, ER, ext 2x10 3\" flex, abd, ER, ext 2 x 10 3\" abd, ER, ext     Wall slides             Finger ladder   10x10\" flex 10x10\" flex 10x10\" flex 10x10\" flex 10x10\" flex 10 sec x 10      AAROM w cane 10x10\" flex 10x10\" flex 2x10 flex 2x10 abd stand, 2x10 flex 2x10 abd stand, 2x10 flex 2x10 abd stand, 2x10 flex 2x10 abd stand, 2x10 flex 2 x 10 each      pulleys 5' gentle 5' gentle 5' pulleys 5' 5' 5' 5' X 5 min      Ther Activity 4/2 4/4 4/16 4/18 4/25                               Gait Training 4/2 4/4                                     Modalities 4/2 4/4 4/9 4/11 4/16 4/18 4/25     ice   10' post 8' post 10' post  10' post  10' post X 10 post                                        "

## 2024-04-30 ENCOUNTER — OFFICE VISIT (OUTPATIENT)
Dept: PHYSICAL THERAPY | Facility: CLINIC | Age: 64
End: 2024-04-30
Payer: COMMERCIAL

## 2024-04-30 DIAGNOSIS — M75.102 TEAR OF LEFT SUPRASPINATUS TENDON: Primary | ICD-10-CM

## 2024-04-30 PROCEDURE — 97110 THERAPEUTIC EXERCISES: CPT

## 2024-04-30 PROCEDURE — 97140 MANUAL THERAPY 1/> REGIONS: CPT

## 2024-04-30 NOTE — PROGRESS NOTES
"Daily Note     Today's date: 2024  Patient name: Ralph Leyva  : 1960  MRN: [id number]  Referring provider: Sergio Reid*  Dx:   Encounter Diagnosis     ICD-10-CM    1. Tear of left supraspinatus tendon  M75.102           Start Time: 1100  Stop Time: 1141  Total time in clinic (min): 41 minutes    Subjective: Reports that he is doing well. No new complaints.       Objective: See treatment diary below    [id number] IEP   [id number] 1on1 (23)     Assessment: Tolerated treatment well. Continued per primary therapist POC and protocol. Continues to be doing well with his exercises and protocol. No increase in symptoms throughout session, is excited for a few weeks from now when he will be able to do some strengthening following week 12 per protocol. Patient demonstrated fatigue post treatment, exhibited good technique with therapeutic exercises, and would benefit from continued PT      Plan: Continue per plan of care.  Progress treatment as tolerated.       Precautions: s/p L shoulder arthroscopic RTC repair with biceps tenodesis on 24  As per MD (24): NWB LUE in sling x 6 weeks (remove sling on 24).  Can remove Pillow at any time as it's for comfort only.  For now, may start elbow/wrist/hand range of motion. Pendulums to tolerance   Protocol in office visit with MD on 24  NO STRENGTHENING UNTIL WEEK 12  Isometrics ok, no more than coffee cup weight (2#) until 12 weeks.    Date     Visit # 11 12 13 14 15 16 17 18 19     FOTO   nv done         Re-eval               Manuals     L shoulder PROM HY HY DK DK HY HY KM GFx 10 min  MH x10 min                                           Neuro Re-Ed      Scap retracts 20x5\" 20x5\" 20x5\" 20x5\" 20x5\" 20x5\" 20x5\" 20 x 5\"  20x5\"     Pendulums 1'  fwd, s/s, circles 1'  fwd, s/s, circles 1' fwd, s/s, circles 1' fwd, s/s, circles " "1' fwd, s/s, circles 1' fwd, s/s, circles 1' fwd, s/s, circles 1' fwd, s/s, circles  1' fwd s/s, circles                                                        Pt Edu   DK DK         Ther Ex 4/2 4/4 4/9 4/11 4/16 4/18 4/25 4/30    Wrist flex AROM 2# 2x10 2# 2x10           Wrist ext AROM 2# 2x10 2# 2x10           Elbow flex/ext AROM 2# 2x10 2# 2x10           Towel squeezes 40x5\" blue 40x5\" blue 30x5\" black  30x5\" blback  30x5\" black  30x5\" black   Gripper - black 5\" x 30      Table slides 20x5\" pain free flex 20x5\" pain free flex 20x5\" pain free flex  20x5\" pain free flex 20x5\" pain free flex 20x5\" pain free flex 20x5\" pain free flex 20 x 5\" free flex  20x5\" pain free flex    Shoulder raises   2x10 flex ~80 deg, 2x10 abd ~40 deg 2x10 flex ~90 degrees, 2x10 abd ~40 degrees 2x10 flex ~90 degrees, 2x10 abd ~40 degrees 2x10 flex ~90 degrees, 2x10 abd ~40 degrees 2x10 flex ~90 degrees, 2x10 abd ~40 degrees 2 x 10 flex to 90, 2 x10 abd - to 40  2 x 10 flex to 90, 2 x10 abd - to 40    Deltoid isometrics   2x10 2\" flex, abd, ER, ext 2x10 3\" flex, abd, ER, ext 2x10 3\" flex, abd, ER, ext 2x10 3\" flex, abd, ER, ext 2x10 3\" flex, abd, ER, ext 2 x 10 3\" abd, ER, ext 2x10 3\" abd, ER, ext     Wall slides             Finger ladder   10x10\" flex 10x10\" flex 10x10\" flex 10x10\" flex 10x10\" flex 10 sec x 10  10\"x10    AAROM w cane 10x10\" flex 10x10\" flex 2x10 flex 2x10 abd stand, 2x10 flex 2x10 abd stand, 2x10 flex 2x10 abd stand, 2x10 flex 2x10 abd stand, 2x10 flex 2 x 10 each  2x10 flex supine, 2x10 abd stand    pulleys 5' gentle 5' gentle 5' pulleys 5' 5' 5' 5' X 5 min  X5 min     Ther Activity 4/2 4/4 4/16 4/18 4/25 4/30                               Gait Training 4/2 4/4                                     Modalities 4/2 4/4 4/9 4/11 4/16 4/18 4/25 4/30    ice   10' post 8' post 10' post  10' post  10' post X 10 post  X10 post                                        "

## 2024-05-03 ENCOUNTER — OFFICE VISIT (OUTPATIENT)
Dept: PHYSICAL THERAPY | Facility: CLINIC | Age: 64
End: 2024-05-03
Payer: COMMERCIAL

## 2024-05-03 DIAGNOSIS — M75.102 TEAR OF LEFT SUPRASPINATUS TENDON: Primary | ICD-10-CM

## 2024-05-03 PROCEDURE — 97140 MANUAL THERAPY 1/> REGIONS: CPT

## 2024-05-03 PROCEDURE — 97110 THERAPEUTIC EXERCISES: CPT

## 2024-05-03 PROCEDURE — 97112 NEUROMUSCULAR REEDUCATION: CPT

## 2024-05-03 NOTE — PROGRESS NOTES
"Daily Note     Today's date: 5/3/2024  Patient name: Ralph Leyva  : 1960  MRN: [id number]  Referring provider: Sergio Reid*  Dx:   Encounter Diagnosis     ICD-10-CM    1. Tear of left supraspinatus tendon  M75.102                      Subjective: Reports that he is doing well. No new complaints.       Objective: See treatment diary below    [id number] IEP   [id number] 1on1 (23)     Assessment: Tolerated treatment well. Patient had much relief after passive stretching directly after pulleys. States he may prefer that each time he is here in order to continue on with the rest of his exercises comfortably.  Patient demonstrated fatigue post treatment, exhibited good technique with therapeutic exercises, and would benefit from continued PT      Plan: Continue per plan of care.  Progress treatment as tolerated.       Precautions: s/p L shoulder arthroscopic RTC repair with biceps tenodesis on 24  As per MD (24): NWB LUE in sling x 6 weeks (remove sling on 24).  Can remove Pillow at any time as it's for comfort only.  For now, may start elbow/wrist/hand range of motion. Pendulums to tolerance   Protocol in office visit with MD on 24  NO STRENGTHENING UNTIL WEEK 12  Isometrics ok, no more than coffee cup weight (2#) until 12 weeks.    Date  5/3   Visit # 11 12 13 14 15 16 17 18 19  20   FOTO   nv done         Re-eval               Manuals  5/3   L shoulder PROM HY HY DK DK HY HY KM GFx 10 min  MH x10 min MHP x 10 min                                           Neuro Re-Ed   5/3   Scap retracts 20x5\" 20x5\" 20x5\" 20x5\" 20x5\" 20x5\" 20x5\" 20 x 5\"  20x5\"  20 x 5\"   Pendulums 1'  fwd, s/s, circles 1'  fwd, s/s, circles 1' fwd, s/s, circles 1' fwd, s/s, circles 1' fwd, s/s, circles 1' fwd, s/s, circles 1' fwd, s/s, circles 1' fwd, s/s, circles  1' fwd s/s, circles 1' fwd, " "s/s circles                                                       Pt Edu   DK DK         Ther Ex 4/2 4/4 4/9 4/11 4/16 4/18  4/25 4/30 5/3   Wrist flex AROM 2# 2x10 2# 2x10           Wrist ext AROM 2# 2x10 2# 2x10           Elbow flex/ext AROM 2# 2x10 2# 2x10           Towel squeezes 40x5\" blue 40x5\" blue 30x5\" black  30x5\" blback  30x5\" black  30x5\" black   Gripper - black 5\" x 30      Table slides 20x5\" pain free flex 20x5\" pain free flex 20x5\" pain free flex  20x5\" pain free flex 20x5\" pain free flex 20x5\" pain free flex 20x5\" pain free flex 20 x 5\" free flex  20x5\" pain free flex 20 x 5\"    Shoulder raises   2x10 flex ~80 deg, 2x10 abd ~40 deg 2x10 flex ~90 degrees, 2x10 abd ~40 degrees 2x10 flex ~90 degrees, 2x10 abd ~40 degrees 2x10 flex ~90 degrees, 2x10 abd ~40 degrees 2x10 flex ~90 degrees, 2x10 abd ~40 degrees 2 x 10 flex to 90, 2 x10 abd - to 40  2 x 10 flex to 90, 2 x10 abd - to 40 2 x 10 flex to 90 2 x 10 abd   Deltoid isometrics   2x10 2\" flex, abd, ER, ext 2x10 3\" flex, abd, ER, ext 2x10 3\" flex, abd, ER, ext 2x10 3\" flex, abd, ER, ext 2x10 3\" flex, abd, ER, ext 2 x 10 3\" abd, ER, ext 2x10 3\" abd, ER, ext  2 x 10   Abd, ER, ext   Wall slides             Finger ladder   10x10\" flex 10x10\" flex 10x10\" flex 10x10\" flex 10x10\" flex 10 sec x 10  10\"x  10 10\" x 10   AAROM w cane 10x10\" flex 10x10\" flex 2x10 flex 2x10 abd stand, 2x10 flex 2x10 abd stand, 2x10 flex 2x10 abd stand, 2x10 flex 2x10 abd stand, 2x10 flex 2 x 10 each  2x10 flex supine, 2x10 abd stand  2 x 10    pulleys 5' gentle 5' gentle 5' pulleys 5' 5' 5' 5' X 5 min  X5 min  X 6 min    Ther Activity 4/2 4/4   4/16 4/18  4/25 4/30  5/3                             Gait Training 4/2 4/4                                     Modalities 4/2 4/4 4/9 4/11 4/16 4/18  4/25 4/30 5/3   ice   10' post 8' post 10' post  10' post  10' post X 10 post  X10 post X 10 post                                       "

## 2024-05-07 ENCOUNTER — OFFICE VISIT (OUTPATIENT)
Dept: PHYSICAL THERAPY | Facility: CLINIC | Age: 64
End: 2024-05-07
Payer: COMMERCIAL

## 2024-05-07 DIAGNOSIS — M75.102 TEAR OF LEFT SUPRASPINATUS TENDON: Primary | ICD-10-CM

## 2024-05-07 PROCEDURE — 97140 MANUAL THERAPY 1/> REGIONS: CPT

## 2024-05-07 PROCEDURE — 97112 NEUROMUSCULAR REEDUCATION: CPT

## 2024-05-07 PROCEDURE — 97110 THERAPEUTIC EXERCISES: CPT

## 2024-05-07 NOTE — PROGRESS NOTES
"Daily Note     Today's date: 2024  Patient name: Ralph Leyva  : 1960  MRN: [id number]  Referring provider: Sergio Reid*  Dx:   Encounter Diagnosis     ICD-10-CM    1. Tear of left supraspinatus tendon  M75.102           Start Time: 1405  Stop Time: 1455  Total time in clinic (min): 50 minutes    Subjective: Pt reports that he is doing well. No new complaints.       Objective: See treatment diary below    [id number] IEP   [id number] 1on1 (23)     Assessment: Tolerated treatment well. Pt continues to work towards goals of increased shoulder ROM. Pt continues to show good challenge with current AAROM exercises.  Patient demonstrated fatigue post treatment, exhibited good technique with therapeutic exercises, and would benefit from continued PT      Plan: Continue per plan of care.  Progress treatment as tolerated.       Precautions: s/p L shoulder arthroscopic RTC repair with biceps tenodesis on 24  As per MD (24): NWB LUE in sling x 6 weeks (remove sling on 24).  Can remove Pillow at any time as it's for comfort only.  For now, may start elbow/wrist/hand range of motion. Pendulums to tolerance   Protocol in office visit with MD on 24  NO STRENGTHENING UNTIL WEEK 12  Isometrics ok, no more than coffee cup weight (2#) until 12 weeks.    Date  5/3   Visit # 21    15 16 17 18 19  20   FOTO             Re-eval               Manuals  5/3   L shoulder PROM HY    HY HY KM GFx 10 min  MH x10 min MHP x 10 min                                           Neuro Re-Ed   5/3   Scap retracts 20 x 5\"    20x5\" 20x5\" 20x5\" 20 x 5\"  20x5\"  20 x 5\"   Pendulums 1' fwd, s/s circles    1' fwd, s/s, circles 1' fwd, s/s, circles 1' fwd, s/s, circles 1' fwd, s/s, circles  1' fwd s/s, circles 1' fwd, s/s circles                                                       Pt Edu             Ther Ex  " "4/30 5/3   Wrist flex AROM             Wrist ext AROM             Elbow flex/ext AROM             Towel squeezes     30x5\" black  30x5\" black   Gripper - black 5\" x 30      Table slides 20 x 5\"     20x5\" pain free flex 20x5\" pain free flex 20x5\" pain free flex 20 x 5\" free flex  20x5\" pain free flex 20 x 5\"    Shoulder raises 2 x 10 flex to 90 2 x 10 abd    2x10 flex ~90 degrees, 2x10 abd ~40 degrees 2x10 flex ~90 degrees, 2x10 abd ~40 degrees 2x10 flex ~90 degrees, 2x10 abd ~40 degrees 2 x 10 flex to 90, 2 x10 abd - to 40  2 x 10 flex to 90, 2 x10 abd - to 40 2 x 10 flex to 90 2 x 10 abd   Deltoid isometrics 2 x 10   Abd, ER, ext    2x10 3\" flex, abd, ER, ext 2x10 3\" flex, abd, ER, ext 2x10 3\" flex, abd, ER, ext 2 x 10 3\" abd, ER, ext 2x10 3\" abd, ER, ext  2 x 10   Abd, ER, ext   Wall slides             Finger ladder 10x10\"    10x10\" flex 10x10\" flex 10x10\" flex 10 sec x 10  10\"x  10 10\" x 10   AAROM w cane 2x10 flex supine, 2x10 abd stand    2x10 abd stand, 2x10 flex 2x10 abd stand, 2x10 flex 2x10 abd stand, 2x10 flex 2 x 10 each  2x10 flex supine, 2x10 abd stand  2 x 10    pulleys 6'    5' 5' 5' X 5 min  X5 min  X 6 min    Ther Activity 5/7 4/16 4/18 4/25 4/30  5/3                             Gait Training 5/7                                      Modalities 5/7 4/16 4/18 4/25 4/30 5/3   ice 10' post    10' post  10' post  10' post X 10 post  X10 post X 10 post                                       "

## 2024-05-09 ENCOUNTER — OFFICE VISIT (OUTPATIENT)
Dept: PHYSICAL THERAPY | Facility: CLINIC | Age: 64
End: 2024-05-09
Payer: COMMERCIAL

## 2024-05-09 DIAGNOSIS — M75.102 TEAR OF LEFT SUPRASPINATUS TENDON: Primary | ICD-10-CM

## 2024-05-09 PROCEDURE — 97112 NEUROMUSCULAR REEDUCATION: CPT

## 2024-05-09 PROCEDURE — 97110 THERAPEUTIC EXERCISES: CPT

## 2024-05-09 PROCEDURE — 97140 MANUAL THERAPY 1/> REGIONS: CPT

## 2024-05-09 NOTE — PROGRESS NOTES
"Daily Note     Today's date: 2024  Patient name: Ralph Leyva  : 1960  MRN: [id number]  Referring provider: Sergio Reid*  Dx:   Encounter Diagnosis     ICD-10-CM    1. Tear of left supraspinatus tendon  M75.102           Start Time: 1410  Stop Time: 1505  Total time in clinic (min): 55 minutes    Subjective: Pt reports that he is doing well. No new complaints.       Objective: See treatment diary below      Assessment: Tolerated treatment well. Pt continues to work towards goals of increased shoulder ROM. Pt continues to show good challenge with current AAROM exercises.  Pt in week 11 of rehab, plan to initiate strengthening as per protocol at week 12. Patient demonstrated fatigue post treatment, exhibited good technique with therapeutic exercises, and would benefit from continued PT      Plan: Continue per plan of care.  Progress treatment as tolerated.       Precautions: s/p L shoulder arthroscopic RTC repair with biceps tenodesis on 24  As per MD (24): NWB LUE in sling x 6 weeks (remove sling on 24).  Can remove Pillow at any time as it's for comfort only.  For now, may start elbow/wrist/hand range of motion. Pendulums to tolerance   Protocol in office visit with MD on 24  NO STRENGTHENING UNTIL WEEK 12  Isometrics ok, no more than coffee cup weight (2#) until 12 weeks.    Date  5/3   Visit # 21 22   15 16 17 18 19  20   FOTO             Re-eval               Manuals  53   L shoulder PROM HY HY   HY HY KM GFx 10 min  MH x10 min MHP x 10 min      Week 11                                      Neuro Re-Ed   5/3   Scap retracts 20 x 5\" 20 x 5\"    20x5\" 20x5\" 20x5\" 20 x 5\"  20x5\"  20 x 5\"   Pendulums 1' fwd, s/s circles 1' fwd, s/s circles   1' fwd, s/s, circles 1' fwd, s/s, circles 1' fwd, s/s, circles 1' fwd, s/s, circles  1' fwd s/s, circles 1' fwd, s/s circles              " "                                         Pt Edu             Ther Ex 5/7 5/9 4/16 4/18 4/25 4/30 5/3   Wrist flex AROM             Wrist ext AROM             Elbow flex/ext AROM             Towel squeezes     30x5\" black  30x5\" black   Gripper - black 5\" x 30      Table slides 20 x 5\"  20 x 5\"    20x5\" pain free flex 20x5\" pain free flex 20x5\" pain free flex 20 x 5\" free flex  20x5\" pain free flex 20 x 5\"    Shoulder raises 2 x 10 flex to 90 2 x 10 abd 2 x 10 flex to 90 2 x 10 abd   2x10 flex ~90 degrees, 2x10 abd ~40 degrees 2x10 flex ~90 degrees, 2x10 abd ~40 degrees 2x10 flex ~90 degrees, 2x10 abd ~40 degrees 2 x 10 flex to 90, 2 x10 abd - to 40  2 x 10 flex to 90, 2 x10 abd - to 40 2 x 10 flex to 90 2 x 10 abd   Deltoid isometrics 2 x 10   Abd, ER, ext 2 x 10   Abd, ER, ext   2x10 3\" flex, abd, ER, ext 2x10 3\" flex, abd, ER, ext 2x10 3\" flex, abd, ER, ext 2 x 10 3\" abd, ER, ext 2x10 3\" abd, ER, ext  2 x 10   Abd, ER, ext   Wall slides             Finger ladder 10x10\" 10x10\"   10x10\" flex 10x10\" flex 10x10\" flex 10 sec x 10  10\"x  10 10\" x 10   AAROM w cane 2x10 flex supine, 2x10 abd stand 2x10 flex supine, 2x10 abd stand   2x10 abd stand, 2x10 flex 2x10 abd stand, 2x10 flex 2x10 abd stand, 2x10 flex 2 x 10 each  2x10 flex supine, 2x10 abd stand  2 x 10    pulleys 6' 6'   5' 5' 5' X 5 min  X5 min  X 6 min    Ther Activity 5/7 5/9 4/16 4/18 4/25 4/30  5/3                             Gait Training 5/7 5/9                                     Modalities 5/7 5/9 4/16 4/18 4/25 4/30 5/3   ice 10' post 10' post   10' post  10' post  10' post X 10 post  X10 post X 10 post                                       "

## 2024-05-14 ENCOUNTER — OFFICE VISIT (OUTPATIENT)
Dept: PHYSICAL THERAPY | Facility: CLINIC | Age: 64
End: 2024-05-14
Payer: COMMERCIAL

## 2024-05-14 DIAGNOSIS — M75.102 TEAR OF LEFT SUPRASPINATUS TENDON: Primary | ICD-10-CM

## 2024-05-14 PROCEDURE — 97112 NEUROMUSCULAR REEDUCATION: CPT

## 2024-05-14 PROCEDURE — 97140 MANUAL THERAPY 1/> REGIONS: CPT

## 2024-05-14 PROCEDURE — 97110 THERAPEUTIC EXERCISES: CPT

## 2024-05-14 NOTE — PROGRESS NOTES
"Daily Note     Today's date: 2024  Patient name: Ralph Leyva  : 1960  MRN: [id number]  Referring provider: Sergio Reid*  Dx:   Encounter Diagnosis     ICD-10-CM    1. Tear of left supraspinatus tendon  M75.102           Start Time: 1410  Stop Time: 1505  Total time in clinic (min): 55 minutes    Subjective: Pt reports that he is doing well. No new complaints.       Objective: See treatment diary below      Assessment: Tolerated treatment well. Pt continues to work towards goals of increased shoulder ROM. Pt continues to show good challenge with current AAROM exercises.  Pt in week 11 of rehab, plan to initiate strengthening as per protocol on Thursday. Patient demonstrated fatigue post treatment, exhibited good technique with therapeutic exercises, and would benefit from continued PT      Plan: Continue per plan of care.  Progress treatment as tolerated.       Precautions: s/p L shoulder arthroscopic RTC repair with biceps tenodesis on 24  As per MD (24): NWB LUE in sling x 6 weeks (remove sling on 24).  Can remove Pillow at any time as it's for comfort only.  For now, may start elbow/wrist/hand range of motion. Pendulums to tolerance   Protocol in office visit with MD on 24  NO STRENGTHENING UNTIL WEEK 12  Isometrics ok, no more than coffee cup weight (2#) until 12 weeks.    Date 5/7 5/9 5/14     4/25 4/30 5/3   Visit # 21 22 23     18 19  20   FOTO             Re-eval               Manuals  5/3   L shoulder PROM HY HY HY     GFx 10 min  MH x10 min MHP x 10 min      Week 11                                      Neuro Re-Ed 5/7 5/9 5/14      4/30  5/3   Scap retracts 20 x 5\" 20 x 5\"  20 x 5\"      20 x 5\"  20x5\"  20 x 5\"   Pendulums 1' fwd, s/s circles 1' fwd, s/s circles 1' fwd, s/s circles     1' fwd, s/s, circles  1' fwd s/s, circles 1' fwd, s/s circles                                                       Pt Edu             Ther Ex  " "5/9 5/14 4/25 4/30 5/3   Wrist flex AROM             Wrist ext AROM             Elbow flex/ext AROM             Towel squeezes        Gripper - black 5\" x 30      Table slides 20 x 5\"  20 x 5\"  20 x 5\"      20 x 5\" free flex  20x5\" pain free flex 20 x 5\"    Shoulder raises 2 x 10 flex to 90 2 x 10 abd 2 x 10 flex to 90 2 x 10 abd 2 x 10 flex to 90 2 x 10 abd     2 x 10 flex to 90, 2 x10 abd - to 40  2 x 10 flex to 90, 2 x10 abd - to 40 2 x 10 flex to 90 2 x 10 abd   Deltoid isometrics 2 x 10   Abd, ER, ext 2 x 10   Abd, ER, ext 2 x 10   Abd, ER, ext     2 x 10 3\" abd, ER, ext 2x10 3\" abd, ER, ext  2 x 10   Abd, ER, ext   Wall slides             Finger ladder 10x10\" 10x10\" 10x10\"     10 sec x 10  10\"x  10 10\" x 10   AAROM w cane 2x10 flex supine, 2x10 abd stand 2x10 flex supine, 2x10 abd stand 2x10 flex supine, 2x10 abd stand     2 x 10 each  2x10 flex supine, 2x10 abd stand  2 x 10    pulleys 6' 6' 6'     X 5 min  X5 min  X 6 min    Ther Activity 5/7 5/9 5/14 4/25 4/30  5/3                             Gait Training 5/7 5/9 5/14                                    Modalities 5/7 5/9 5/14 4/25 4/30 5/3   ice 10' post 10' post 10' post     X 10 post  X10 post X 10 post                                       "

## 2024-05-16 ENCOUNTER — EVALUATION (OUTPATIENT)
Dept: PHYSICAL THERAPY | Facility: CLINIC | Age: 64
End: 2024-05-16
Payer: COMMERCIAL

## 2024-05-16 DIAGNOSIS — M75.102 TEAR OF LEFT SUPRASPINATUS TENDON: Primary | ICD-10-CM

## 2024-05-16 PROCEDURE — 97164 PT RE-EVAL EST PLAN CARE: CPT

## 2024-05-16 PROCEDURE — 97110 THERAPEUTIC EXERCISES: CPT

## 2024-05-16 NOTE — PROGRESS NOTES
Re-evaluation     Today's date: 2024  Patient name: Ralph Leyva  : 1960  MRN: [id number]  Referring provider: Sergio Reid*  Dx:   Encounter Diagnosis     ICD-10-CM    1. Tear of left supraspinatus tendon  M75.102           Start Time: 1445  Stop Time: 1530  Total time in clinic (min): 45 minutes    Subjective: Pt presents to the clinic 12 weeks s/p L shoulder RTC repair. Pt stated that he feels that he is doing very well overall but does feel like he has some difficulty with motion when trying to reach up overhead and to the side but otherwise does not have any discomfort with L shoulder use.       Objective: See treatment diary below    L shoulder AROM:  Flexion -  160 degrees   Abduction- 150 degrees   ER-  70 degrees   IR -  52 degrees     Assessment: Pt presents to the clinic for re evaluation of L shoulder s/p L RTC repair. Pt demonstrated improved L shoulder AROM compared to last visit with mild discomfort and tightness when going towards end range motions especially in abduction and internal rotation. However, pt does have difficulty with L active shoulder motion past ~90 degrees with some tightness and discomfort. Overall, pt is improving well at the 12 week ursula and will gradually add L shoulder strengthening activities to PT POC to pt's tolerance and functional ability. Pt was challenged appropriately with addition of ER and IR walkout with light resistance with mild fatigue post performance but no increase in discomfort. Pt was given resistance band to try isometric activities at home. Pt would benefit from continued skilled PT to improve L shoulder mobility, ROM, flexibility, strength, scapular stability, and functional ability.     Plan: Continue per plan of care.  Progress treatment as tolerated.       Precautions: s/p L shoulder arthroscopic RTC repair with biceps tenodesis on 24  As per MD (24): NWB LUE in sling x 6 weeks (remove sling on 24).  Can  "remove Pillow at any time as it's for comfort only.  For now, may start elbow/wrist/hand range of motion. Pendulums to tolerance   Protocol in office visit with MD on 2/26/24  NO STRENGTHENING UNTIL WEEK 12  Isometrics ok, no more than coffee cup weight (2#) until 12 weeks.    Date 5/7 5/9 5/14 5/16    4/25 4/30 5/3   Visit # 21 22 23 24    18 19  20   FOTO    done         Re-eval               Manuals 5/7 5/9 5/14 5/16    4/25 4/30 5/3   L shoulder PROM HY HY HY     GFx 10 min  MH x10 min MHP x 10 min      Week 11   Week 12                                   Neuro Re-Ed 5/7 5/9 5/14 5/16     4/30  5/3   Scap retracts 20 x 5\" 20 x 5\"  20 x 5\"      20 x 5\"  20x5\"  20 x 5\"   Pendulums 1' fwd, s/s circles 1' fwd, s/s circles 1' fwd, s/s circles 1' fwd, s/s, circles    1' fwd, s/s, circles  1' fwd s/s, circles 1' fwd, s/s circles                                                       Pt Edu             Ther Ex 5/7 5/9 5/14 5/16    4/25 4/30 5/3   ER walkout    10x3 steps OTB         IR walkout    10x3 steps OTB         Prone rows    2x10 3# L         Bicep curls    2x15 3# L         Table slides 20 x 5\"  20 x 5\"  20 x 5\"      20 x 5\" free flex  20x5\" pain free flex 20 x 5\"    Shoulder raises 2 x 10 flex to 90 2 x 10 abd 2 x 10 flex to 90 2 x 10 abd 2 x 10 flex to 90 2 x 10 abd     2 x 10 flex to 90, 2 x10 abd - to 40  2 x 10 flex to 90, 2 x10 abd - to 40 2 x 10 flex to 90 2 x 10 abd   Deltoid isometrics 2 x 10   Abd, ER, ext 2 x 10   Abd, ER, ext 2 x 10   Abd, ER, ext     2 x 10 3\" abd, ER, ext 2x10 3\" abd, ER, ext  2 x 10   Abd, ER, ext   Wall slides             Finger ladder 10x10\" 10x10\" 10x10\"     10 sec x 10  10\"x  10 10\" x 10   AAROM w cane 2x10 flex supine, 2x10 abd stand 2x10 flex supine, 2x10 abd stand 2x10 flex supine, 2x10 abd stand 2x10 flex supine 5\", 2x10 abd stand 5\"    2 x 10 each  2x10 flex supine, 2x10 abd stand  2 x 10    pulleys 6' 6' 6' 6'    X 5 min  X5 min  X 6 min    Ther Activity 5/7 5/9 5/14 5/16 "    4/25 4/30  5/3                             Gait Training 5/7 5/9 5/14                                    Modalities 5/7 5/9 5/14     4/25 4/30 5/3   ice 10' post 10' post 10' post     X 10 post  X10 post X 10 post

## 2024-05-21 ENCOUNTER — OFFICE VISIT (OUTPATIENT)
Dept: PHYSICAL THERAPY | Facility: CLINIC | Age: 64
End: 2024-05-21
Payer: COMMERCIAL

## 2024-05-21 DIAGNOSIS — M75.102 TEAR OF LEFT SUPRASPINATUS TENDON: Primary | ICD-10-CM

## 2024-05-21 PROCEDURE — 97140 MANUAL THERAPY 1/> REGIONS: CPT

## 2024-05-21 PROCEDURE — 97110 THERAPEUTIC EXERCISES: CPT

## 2024-05-21 NOTE — PROGRESS NOTES
"Daily Note     Today's date: 2024  Patient name: Ralph Leyva  : 1960  MRN: [id number]  Referring provider: Sergio Reid*  Dx:   Encounter Diagnosis     ICD-10-CM    1. Tear of left supraspinatus tendon  M75.102           Start Time: 1410  Stop Time: 1500  Total time in clinic (min): 50 minutes    Subjective: Pt presents to PT with no c/o pain or symptoms.       Objective: See treatment diary below      Assessment: Tolerated treatment well. Patient demonstrated fatigue post treatment and would benefit from continued PT. Pt continues to work towards goals of increased shoulder strength as per protocol. Pt performed new exercises as noted with no adverse symptoms. Pt will benefit from further skilled PT to increase strength, flexibility and function.       Plan: Continue per plan of care.      Precautions: s/p L shoulder arthroscopic RTC repair with biceps tenodesis on 24  As per MD (24): NWB LUE in sling x 6 weeks (remove sling on 24).  Can remove Pillow at any time as it's for comfort only.  For now, may start elbow/wrist/hand range of motion. Pendulums to tolerance   Protocol in office visit with MD on 24  NO STRENGTHENING UNTIL WEEK 12  Isometrics ok, no more than coffee cup weight (2#) until 12 weeks.    Date  5/3   Visit # 21 22 23 24 25   18 19  20   FOTO    done         Re-eval               Manuals  5/3   L shoulder PROM HY HY HY     GFx 10 min  MH x10 min MHP x 10 min      Week 11   Week 12                                   Neuro Re-Ed   5/3   Scap retracts 20 x 5\" 20 x 5\"  20 x 5\"   BTB 2x10   20 x 5\"  20x5\"  20 x 5\"   Pendulums 1' fwd, s/s circles 1' fwd, s/s circles 1' fwd, s/s circles 1' fwd, s/s, circles 1' fwd, s/s, circles   1' fwd, s/s, circles  1' fwd s/s, circles 1' fwd, s/s circles                                                       Pt Edu           " "  Ther Ex 5/7 5/9 5/14 5/16 5/21 4/25 4/30 5/3   ER walkout    10x3 steps OTB 10x3 steps BTB        IR walkout    10x3 steps OTB 10x3 steps BTB        Prone rows    2x10 3# L 2x10 3# L        Bicep curls    2x15 3# L 2x15 3# L        Table slides 20 x 5\"  20 x 5\"  20 x 5\"      20 x 5\" free flex  20x5\" pain free flex 20 x 5\"    Shoulder raises 2 x 10 flex to 90 2 x 10 abd 2 x 10 flex to 90 2 x 10 abd 2 x 10 flex to 90 2 x 10 abd     2 x 10 flex to 90, 2 x10 abd - to 40  2 x 10 flex to 90, 2 x10 abd - to 40 2 x 10 flex to 90 2 x 10 abd   Deltoid isometrics 2 x 10   Abd, ER, ext 2 x 10   Abd, ER, ext 2 x 10   Abd, ER, ext     2 x 10 3\" abd, ER, ext 2x10 3\" abd, ER, ext  2 x 10   Abd, ER, ext   Wall slides             Finger ladder 10x10\" 10x10\" 10x10\"  10x10\"   10 sec x 10  10\"x  10 10\" x 10   AAROM w cane 2x10 flex supine, 2x10 abd stand 2x10 flex supine, 2x10 abd stand 2x10 flex supine, 2x10 abd stand 2x10 flex supine 5\", 2x10 abd stand 5\" 2x10 flex supine 5\", 2x10 abd stand 5\"   2 x 10 each  2x10 flex supine, 2x10 abd stand  2 x 10    pulleys 6' 6' 6' 6' 3'   X 5 min  X5 min  X 6 min    UBE     3'/3'                     Ther Activity 5/7 5/9 5/14 5/16 5/21 4/25 4/30  5/3                             Gait Training 5/7 5/9 5/14 5/21                                  Modalities 5/7 5/9 5/14 5/21 4/25 4/30 5/3   ice 10' post 10' post 10' post     X 10 post  X10 post X 10 post                                           "

## 2024-05-23 ENCOUNTER — APPOINTMENT (OUTPATIENT)
Dept: PHYSICAL THERAPY | Facility: CLINIC | Age: 64
End: 2024-05-23
Payer: COMMERCIAL

## 2024-05-28 ENCOUNTER — OFFICE VISIT (OUTPATIENT)
Dept: PHYSICAL THERAPY | Facility: CLINIC | Age: 64
End: 2024-05-28
Payer: COMMERCIAL

## 2024-05-28 DIAGNOSIS — M75.102 TEAR OF LEFT SUPRASPINATUS TENDON: Primary | ICD-10-CM

## 2024-05-28 PROCEDURE — 97140 MANUAL THERAPY 1/> REGIONS: CPT

## 2024-05-28 PROCEDURE — 97110 THERAPEUTIC EXERCISES: CPT

## 2024-05-28 NOTE — PROGRESS NOTES
"Daily Note     Today's date: 2024  Patient name: Ralph Leyva  : 1960  MRN: [id number]  Referring provider: Sergio Reid*  Dx:   Encounter Diagnosis     ICD-10-CM    1. Tear of left supraspinatus tendon  M75.102           Start Time: 1400  Stop Time: 1455  Total time in clinic (min): 55 minutes    Subjective: Pt presents to PT with no c/o pain or symptoms.       Objective: See treatment diary below      Assessment: Tolerated treatment well. Patient demonstrated fatigue post treatment and would benefit from continued PT. Pt continues to work towards goals of increased shoulder strength as per protocol. Pt continues to show good tolerance to Cane exercises. Pt will benefit from further skilled PT to increase strength, flexibility and function. Continue to progress as able.       Plan: Continue per plan of care.      Precautions: s/p L shoulder arthroscopic RTC repair with biceps tenodesis on 24  As per MD (24): NWB LUE in sling x 6 weeks (remove sling on 24).  Can remove Pillow at any time as it's for comfort only.  For now, may start elbow/wrist/hand range of motion. Pendulums to tolerance   Protocol in office visit with MD on 24  NO STRENGTHENING UNTIL WEEK 12  Isometrics ok, no more than coffee cup weight (2#) until 12 weeks.    Date        Visit # 21 22 23 24 25 26       FOTO    done         Re-eval               Manuals        L shoulder PROM HY HY HY   HY         Week 11   Week 12                                   Neuro Re-Ed        Scap retracts 20 x 5\" 20 x 5\"  20 x 5\"   BTB 2x10 BTB 2x10       Pendulums 1' fwd, s/s circles 1' fwd, s/s circles 1' fwd, s/s circles 1' fwd, s/s, circles 1' fwd, s/s, circles 1' fwd, s/s, circles                                                           Pt Edu             Ther Ex        ER walkout    10x3 steps OTB 10x3 steps " "BTB 10x3 steps BTB       IR walkout    10x3 steps OTB 10x3 steps BTB 10x3 steps BTB       Prone rows    2x10 3# L 2x10 3# L 2x10 3# L       Bicep curls    2x15 3# L 2x15 3# L 2x15 3# L       Table slides 20 x 5\"  20 x 5\"  20 x 5\"           Shoulder raises 2 x 10 flex to 90 2 x 10 abd 2 x 10 flex to 90 2 x 10 abd 2 x 10 flex to 90 2 x 10 abd          Deltoid isometrics 2 x 10   Abd, ER, ext 2 x 10   Abd, ER, ext 2 x 10   Abd, ER, ext          Wall slides             Finger ladder 10x10\" 10x10\" 10x10\"  10x10\" 10x10\"       AAROM w cane 2x10 flex supine, 2x10 abd stand 2x10 flex supine, 2x10 abd stand 2x10 flex supine, 2x10 abd stand 2x10 flex supine 5\", 2x10 abd stand 5\" 2x10 flex supine 5\", 2x10 abd stand 5\" 2x10 flex supine 5\", 2x10 abd stand 5\"       pulleys 6' 6' 6' 6' 3' 3'       UBE     3'/3' 3'/3'                    Ther Activity 5/7 5/9 5/14 5/16 5/21 5/28                                 Gait Training 5/7 5/9 5/14 5/21 5/28                                 Modalities 5/7 5/9 5/14 5/21 5/28       ice 10' post 10' post 10' post                              " No deformity or limitation of movement

## 2024-05-30 ENCOUNTER — OFFICE VISIT (OUTPATIENT)
Dept: PHYSICAL THERAPY | Facility: CLINIC | Age: 64
End: 2024-05-30
Payer: COMMERCIAL

## 2024-05-30 DIAGNOSIS — M75.102 TEAR OF LEFT SUPRASPINATUS TENDON: Primary | ICD-10-CM

## 2024-05-30 PROCEDURE — 97110 THERAPEUTIC EXERCISES: CPT

## 2024-05-30 PROCEDURE — 97112 NEUROMUSCULAR REEDUCATION: CPT

## 2024-05-30 NOTE — PROGRESS NOTES
Daily Note     Today's date: 2024  Patient name: Ralph Leyva  : 1960  MRN: [id number]  Referring provider: Sergio Reid*  Dx:   Encounter Diagnosis     ICD-10-CM    1. Tear of left supraspinatus tendon  M75.102           Start Time: 1400  Stop Time: 1450  Total time in clinic (min): 50 minutes    Subjective: Pt stated that he is doing well at this time and is going to be seeing his doctor next week.       Objective: See treatment diary below      Assessment: Pt tolerated warm up on pulleys well at beginning of session without increase in discomfort during or after activity. Pt was challenged appropriately with slight increase in resistance with performance of isometric ER and IR walkout. Pt tolerated addition of light resisted active ER and IR activities with mild soreness post performance but no increase in discomfort. Pt performed mobility and stretching activities well with mild improvement in tightness post performance. Pt tolerated application of ice at end of session well with reported decrease in discomfort post modality. Pt would benefit from continued skilled PT to improve L shoulder mobility, flexibility, ROM, strength, scapular stability, and functional ability.       Plan: Continue per plan of care.  Progress treatment as tolerated.       Precautions: s/p L shoulder arthroscopic RTC repair with biceps tenodesis on 24  As per MD (24): NWB LUE in sling x 6 weeks (remove sling on 24).  Can remove Pillow at any time as it's for comfort only.  For now, may start elbow/wrist/hand range of motion. Pendulums to tolerance   Protocol in office visit with MD on 24  NO STRENGTHENING UNTIL WEEK 12  Isometrics ok, no more than coffee cup weight (2#) until 12 weeks.    Date       Visit # 21 22 23 24 25 26 27      FOTO    done         Re-eval               Manuals       L shoulder PROM HY HY HY   HY DK     "    Week 11   Week 12                                   Neuro Re-Ed 5/7 5/9 5/14 5/16 5/21 5/28 5/30      Scap retracts 20 x 5\" 20 x 5\"  20 x 5\"   BTB 2x10 BTB 2x10 2x15 row/ext BTB      Pendulums 1' fwd, s/s circles 1' fwd, s/s circles 1' fwd, s/s circles 1' fwd, s/s, circles 1' fwd, s/s, circles 1' fwd, s/s, circles                                                           Pt Edu             Ther Ex 5/7 5/9 5/14 5/16 5/21 5/28 5/30      ER walkout    10x3 steps OTB 10x3 steps BTB 10x3 steps BTB 10x3 steps black TB      IR walkout    10x3 steps OTB 10x3 steps BTB 10x3 steps BTB 10x3 step black      ER/IR        2x10 RTB active                   Prone rows    2x10 3# L 2x10 3# L 2x10 3# L 2x10 5# L      Bicep curls    2x15 3# L 2x15 3# L 2x15 3# L 2x15 5# L      Table slides 20 x 5\"  20 x 5\"  20 x 5\"           Shoulder raises 2 x 10 flex to 90 2 x 10 abd 2 x 10 flex to 90 2 x 10 abd 2 x 10 flex to 90 2 x 10 abd          Deltoid isometrics 2 x 10   Abd, ER, ext 2 x 10   Abd, ER, ext 2 x 10   Abd, ER, ext          Wall slides             Finger ladder 10x10\" 10x10\" 10x10\"  10x10\" 10x10\" 10x10\"      AAROM w cane 2x10 flex supine, 2x10 abd stand 2x10 flex supine, 2x10 abd stand 2x10 flex supine, 2x10 abd stand 2x10 flex supine 5\", 2x10 abd stand 5\" 2x10 flex supine 5\", 2x10 abd stand 5\" 2x10 flex supine 5\", 2x10 abd stand 5\" 2x10 flex supine, 2x10 abd stand      pulleys 6' 6' 6' 6' 3' 3' 4'      UBE     3'/3' 3'/3' 3'/3'                   Ther Activity 5/7 5/9 5/14 5/16 5/21 5/28                                 Gait Training 5/7 5/9 5/14 5/21 5/28                                 Modalities 5/7 5/9 5/14 5/21 5/28       ice 10' post 10' post 10' post                                "

## 2024-06-04 ENCOUNTER — OFFICE VISIT (OUTPATIENT)
Dept: PHYSICAL THERAPY | Facility: CLINIC | Age: 64
End: 2024-06-04
Payer: COMMERCIAL

## 2024-06-04 DIAGNOSIS — M75.102 TEAR OF LEFT SUPRASPINATUS TENDON: Primary | ICD-10-CM

## 2024-06-04 PROCEDURE — 97112 NEUROMUSCULAR REEDUCATION: CPT

## 2024-06-04 PROCEDURE — 97140 MANUAL THERAPY 1/> REGIONS: CPT

## 2024-06-04 PROCEDURE — 97110 THERAPEUTIC EXERCISES: CPT

## 2024-06-04 NOTE — PROGRESS NOTES
Daily Note     Today's date: 2024  Patient name: Ralph Leyva  : 1960  MRN: [id number]  Referring provider: Sergio Reid*  Dx:   Encounter Diagnosis     ICD-10-CM    1. Tear of left supraspinatus tendon  M75.102           Start Time: 1410  Stop Time: 1525  Total time in clinic (min): 75 minutes    Subjective: Pt stated that he is doing well at this time and is going to be seeing his doctor next week.       Objective: See treatment diary below      Assessment: Pt tolerated warm up on pulleys well at beginning of session without increase in discomfort during or after activity. Pt was challenged appropriately with slight increase in resistance with performance of isometric ER and IR walkout. Pt tolerated addition of light resisted active ER and IR activities with mild soreness post performance but no increase in discomfort. Pt performed mobility and stretching activities well with mild improvement in tightness post performance. Pt tolerated application of ice at end of session well with reported decrease in discomfort post modality. Pt would benefit from continued skilled PT to improve L shoulder mobility, flexibility, ROM, strength, scapular stability, and functional ability.       Plan: Continue per plan of care.  Progress treatment as tolerated.       Precautions: s/p L shoulder arthroscopic RTC repair with biceps tenodesis on 24  As per MD (24): NWB LUE in sling x 6 weeks (remove sling on 24).  Can remove Pillow at any time as it's for comfort only.  For now, may start elbow/wrist/hand range of motion. Pendulums to tolerance   Protocol in office visit with MD on 24  NO STRENGTHENING UNTIL WEEK 12  Isometrics ok, no more than coffee cup weight (2#) until 12 weeks.    Date      Visit # 21 22 23 24 25 26 27 28     FOTO    done         Re-eval               Manuals      L shoulder PROM HY HY HY   HY  "DK HY       Week 11   Week 12                                   Neuro Re-Ed 5/7 5/9 5/14 5/16 5/21 5/28 5/30 6/4     Scap retracts 20 x 5\" 20 x 5\"  20 x 5\"   BTB 2x10 BTB 2x10 2x15 row/ext BTB 2x15 row/ext BTB     Pendulums 1' fwd, s/s circles 1' fwd, s/s circles 1' fwd, s/s circles 1' fwd, s/s, circles 1' fwd, s/s, circles 1' fwd, s/s, circles                                                           Pt Edu             Ther Ex 5/7 5/9 5/14 5/16 5/21 5/28 5/30 6/4     ER walkout    10x3 steps OTB 10x3 steps BTB 10x3 steps BTB 10x3 steps black TB 10x3 steps black TB     IR walkout    10x3 steps OTB 10x3 steps BTB 10x3 steps BTB 10x3 step black 10x3 steps black TB     ER/IR        2x10 RTB active 2x10 RTB active     SL ER        2x10     Prone rows    2x10 3# L 2x10 3# L 2x10 3# L 2x10 5# L 2x10 5# L     Prone T& I        2x10 0#     Bicep curls    2x15 3# L 2x15 3# L 2x15 3# L 2x15 5# L 2x10 5# L     Table slides 20 x 5\"  20 x 5\"  20 x 5\"           Shoulder raises 2 x 10 flex to 90 2 x 10 abd 2 x 10 flex to 90 2 x 10 abd 2 x 10 flex to 90 2 x 10 abd          Deltoid isometrics 2 x 10   Abd, ER, ext 2 x 10   Abd, ER, ext 2 x 10   Abd, ER, ext          Wall slides             Finger ladder 10x10\" 10x10\" 10x10\"  10x10\" 10x10\" 10x10\" 10x10\"     AAROM w cane 2x10 flex supine, 2x10 abd stand 2x10 flex supine, 2x10 abd stand 2x10 flex supine, 2x10 abd stand 2x10 flex supine 5\", 2x10 abd stand 5\" 2x10 flex supine 5\", 2x10 abd stand 5\" 2x10 flex supine 5\", 2x10 abd stand 5\" 2x10 flex supine, 2x10 abd stand 2x10 flex supine, 2x10 abd stand     pulleys 6' 6' 6' 6' 3' 3' 4' 4'     UBE     3'/3' 3'/3' 3'/3' 3'/3'                  Ther Activity 5/7 5/9 5/14 5/16 5/21 5/28  6/4                               Gait Training 5/7 5/9 5/14 5/21 5/28  6/4                               Modalities 5/7 5/9 5/14 5/21 5/28  6/4     ice 10' post 10' post 10' post                                "

## 2024-06-06 ENCOUNTER — OFFICE VISIT (OUTPATIENT)
Dept: OBGYN CLINIC | Facility: OTHER | Age: 64
End: 2024-06-06
Payer: COMMERCIAL

## 2024-06-06 VITALS
SYSTOLIC BLOOD PRESSURE: 123 MMHG | HEIGHT: 69 IN | HEART RATE: 72 BPM | WEIGHT: 267 LBS | BODY MASS INDEX: 39.55 KG/M2 | DIASTOLIC BLOOD PRESSURE: 80 MMHG

## 2024-06-06 DIAGNOSIS — M75.102 TEAR OF LEFT SUPRASPINATUS TENDON: Primary | ICD-10-CM

## 2024-06-06 PROCEDURE — 99213 OFFICE O/P EST LOW 20 MIN: CPT | Performed by: ORTHOPAEDIC SURGERY

## 2024-06-06 NOTE — PROGRESS NOTES
"  Assessment  Diagnoses and all orders for this visit:    Tear of left supraspinatus tendon - s/p Revision Repair 2/23/2024     Discussion and Plan:    Please with patient's progress thus far, we have taken PT slower as this was a revision.   Continue PT per protocol wean to HEP under the guidance of the therapist.  Follow up as needed.     Subjective:   Patient ID: Ralph Leyva is a 63 y.o. male      HPI  Patient presents today 3 1/2 mos s/p arthroscopic left shoulder rotator cuff repair (revision) with bicep tenodesis 2/23/24.  Patient is attending PT as instructed.  Patient reports overall he is doing well and is please with his progress.       The following portions of the patient's history were reviewed and updated as appropriate: allergies, current medications, past family history, past medical history, past social history, past surgical history and problem list.    Objective:  /80 (BP Location: Right arm, Patient Position: Sitting, Cuff Size: Large)   Pulse 72   Ht 5' 8.5\" (1.74 m) Comment: verbal  Wt 121 kg (267 lb)   BMI 40.01 kg/m²       Left Shoulder Exam     Tenderness   The patient is experiencing no tenderness.     Range of Motion   The patient has normal left shoulder ROM.    Muscle Strength   Abduction: 3/5   External rotation: 5/5     Other   Erythema: absent  Sensation: normal  Pulse: present             Physical Exam  Vitals reviewed.   Constitutional:       Appearance: He is well-developed.   HENT:      Head: Normocephalic.   Eyes:      Pupils: Pupils are equal, round, and reactive to light.   Pulmonary:      Effort: Pulmonary effort is normal.   Abdominal:      General: Abdomen is flat. There is no distension.   Skin:     General: Skin is warm and dry.           Scribe Attestation      I,:  Prachi Perez am acting as a scribe while in the presence of the attending physician.:       I,:  Sergio Ried MD personally performed the services described in this " documentation    as scribed in my presence.:

## 2024-06-07 ENCOUNTER — OFFICE VISIT (OUTPATIENT)
Dept: PHYSICAL THERAPY | Facility: CLINIC | Age: 64
End: 2024-06-07
Payer: COMMERCIAL

## 2024-06-07 DIAGNOSIS — M75.102 TEAR OF LEFT SUPRASPINATUS TENDON: Primary | ICD-10-CM

## 2024-06-07 PROCEDURE — 97112 NEUROMUSCULAR REEDUCATION: CPT

## 2024-06-07 PROCEDURE — 97110 THERAPEUTIC EXERCISES: CPT

## 2024-06-07 PROCEDURE — 97010 HOT OR COLD PACKS THERAPY: CPT

## 2024-06-07 NOTE — PROGRESS NOTES
Daily Note     Today's date: 2024  Patient name: Ralph Leyva  : 1960  MRN: [id number]  Referring provider: Sergio Reid*  Dx:   Encounter Diagnosis     ICD-10-CM    1. Tear of left supraspinatus tendon  M75.102           Start Time: 1045  Stop Time: 1136  Total time in clinic (min): 51 minutes    Subjective: Pt stated that overall he is doing very well and only has discomfort and difficulty with raising his shoulder to the side.       Objective: See treatment diary below      Assessment: Pt tolerated warm up on pulleys and UBE well at beginning of session without increase in discomfort during or after activity. Pt demonstrated good flexion AROM with L shoulder, but continues to have difficulty and some discomfort with active abduction past 90 degrees in standing. Pt required mild verbal cueing throughout visit due to performing some mobility activities too quickly and aggressively, pt adapted to instruction well. Pt tolerated manual L shoulder PROM and stretching with reported decrease in stiffness post manual treatment. Pt tolerated application of ice at end of session well with reported decrease in discomfort post modality. Pt would benefit from continued skilled PT to improve L shoulder strength, mobility, flexibility, ROM, scapular stability, and functional ability.     Plan: Continue per plan of care.  Progress treatment as tolerated.       Precautions: s/p L shoulder arthroscopic RTC repair with biceps tenodesis on 24  As per MD (24): NWB LUE in sling x 6 weeks (remove sling on 24).  Can remove Pillow at any time as it's for comfort only.  For now, may start elbow/wrist/hand range of motion. Pendulums to tolerance   Protocol in office visit with MD on 24  NO STRENGTHENING UNTIL WEEK 12  Isometrics ok, no more than coffee cup weight (2#) until 12 weeks.    Date     Visit # 21 22 23 24 25 26 27 28 29    FOTO    done        "  Re-eval               Manuals 5/7 5/9 5/14 5/16 5/21 5/28 5/30 6/4 6/7    L shoulder PROM HY HY HY   HY DK HY       Week 11   Week 12                                   Neuro Re-Ed 5/7 5/9 5/14 5/16 5/21 5/28 5/30 6/4 6/7    Scap retracts 20 x 5\" 20 x 5\"  20 x 5\"   BTB 2x10 BTB 2x10 2x15 row/ext BTB 2x15 row/ext BTB nv    Pendulums 1' fwd, s/s circles 1' fwd, s/s circles 1' fwd, s/s circles 1' fwd, s/s, circles 1' fwd, s/s, circles 1' fwd, s/s, circles                                                           Pt Edu             Ther Ex 5/7 5/9 5/14 5/16 5/21 5/28 5/30 6/4 6/7    ER walkout    10x3 steps OTB 10x3 steps BTB 10x3 steps BTB 10x3 steps black TB 10x3 steps black TB 10x3 steps black TB     IR walkout    10x3 steps OTB 10x3 steps BTB 10x3 steps BTB 10x3 step black 10x3 steps black TB 10x3 steps black TB    ER/IR        2x10 RTB active 2x10 RTB active 2x10 GTB active    SL ER        2x10 2x10 ER, s/l abd    Prone rows    2x10 3# L 2x10 3# L 2x10 3# L 2x10 5# L 2x10 5# L 2x10 5# L    Prone T& I        2x10 0# 2x10 active    Bicep curls    2x15 3# L 2x15 3# L 2x15 3# L 2x15 5# L 2x10 5# L 2x10 5# ea    Table slides 20 x 5\"  20 x 5\"  20 x 5\"           Shoulder raises 2 x 10 flex to 90 2 x 10 abd 2 x 10 flex to 90 2 x 10 abd 2 x 10 flex to 90 2 x 10 abd          Deltoid isometrics 2 x 10   Abd, ER, ext 2 x 10   Abd, ER, ext 2 x 10   Abd, ER, ext          Wall slides             Finger ladder 10x10\" 10x10\" 10x10\"  10x10\" 10x10\" 10x10\" 10x10\" 10x10\"    AAROM w cane 2x10 flex supine, 2x10 abd stand 2x10 flex supine, 2x10 abd stand 2x10 flex supine, 2x10 abd stand 2x10 flex supine 5\", 2x10 abd stand 5\" 2x10 flex supine 5\", 2x10 abd stand 5\" 2x10 flex supine 5\", 2x10 abd stand 5\" 2x10 flex supine, 2x10 abd stand 2x10 flex supine, 2x10 abd stand 2x10 flex sup, 2x10 3\" abd stand slow    pulleys 6' 6' 6' 6' 3' 3' 4' 4' 5'    UBE     3'/3' 3'/3' 3'/3' 3'/3' 3'/3'                 Ther Activity 5/7 5/9 5/14 5/16 5/21 5/28 "  6/4                               Gait Training 5/7 5/9 5/14 5/21 5/28 6/4                               Modalities 5/7 5/9 5/14 5/21 5/28 6/4 6/7    ice 10' post 10' post 10' post      10' post

## 2024-06-11 ENCOUNTER — OFFICE VISIT (OUTPATIENT)
Dept: PHYSICAL THERAPY | Facility: CLINIC | Age: 64
End: 2024-06-11
Payer: COMMERCIAL

## 2024-06-11 DIAGNOSIS — M75.102 TEAR OF LEFT SUPRASPINATUS TENDON: Primary | ICD-10-CM

## 2024-06-11 PROCEDURE — 97112 NEUROMUSCULAR REEDUCATION: CPT

## 2024-06-11 PROCEDURE — 97110 THERAPEUTIC EXERCISES: CPT

## 2024-06-11 PROCEDURE — 97010 HOT OR COLD PACKS THERAPY: CPT

## 2024-06-11 NOTE — PROGRESS NOTES
Daily Note     Today's date: 2024  Patient name: Ralph Leyva  : 1960  MRN: [id number]  Referring provider: Sergio Reid*  Dx:   Encounter Diagnosis     ICD-10-CM    1. Tear of left supraspinatus tendon  M75.102           Start Time: 1430  Stop Time: 1520  Total time in clinic (min): 50 minutes    Subjective: Pt stated that he is doing well, and that the doctor told him that we can progress his program as tolerated and can wean towards HEP when pt is demonstrating L shoulder function WNL. Pt is 15.5 weeks post op.      Objective: See treatment diary below      Assessment: Pt tolerated warm up on UBE and pulleys well at beginning of session without increase in discomfort during or after activity. Pt performed shoulder scaption and abduction raises with improved form compared to when last performed, but did show increased fatigue in LUE compared to RUE when performing bilateral raises, especially in abduction. Pt was challenged appropriately with addition of functional reaching activity with cones in flexion and abduction but had increased fatigue and soreness with abduction reaching. Overall, pt is showing good improvements in L shoulder strength, mobility, and ROM and would benefit from continued skilled PT to improve functional deficits.       Plan: Continue per plan of care.  Progress treatment as tolerated.       Precautions: s/p L shoulder arthroscopic RTC repair with biceps tenodesis on 24  As per MD (24): NWB LUE in sling x 6 weeks (remove sling on 24).  Can remove Pillow at any time as it's for comfort only.  For now, may start elbow/wrist/hand range of motion. Pendulums to tolerance   Protocol in office visit with MD on 24  NO STRENGTHENING UNTIL WEEK 12  Isometrics ok, no more than coffee cup weight (2#) until 12 weeks.    Date    Visit # 21 22 23 24 25 26 27 28 29 30   FOTO    done      done   Re-eval            "    Manuals 5/7 5/9 5/14 5/16 5/21 5/28 5/30 6/4 6/7 6/11   L shoulder PROM HY HY HY   HY DK HY       Week 11   Week 12                                   Neuro Re-Ed 5/7 5/9 5/14 5/16 5/21 5/28 5/30 6/4 6/7 6/11   Scap retracts 20 x 5\" 20 x 5\"  20 x 5\"   BTB 2x10 BTB 2x10 2x15 row/ext BTB 2x15 row/ext BTB nv 2x15 row/ext black TB   Pendulums 1' fwd, s/s circles 1' fwd, s/s circles 1' fwd, s/s circles 1' fwd, s/s, circles 1' fwd, s/s, circles 1' fwd, s/s, circles       No monies          20x OTB                                          Pt Edu             Ther Ex 5/7 5/9 5/14 5/16 5/21 5/28 5/30 6/4 6/7 6/11   ER walkout    10x3 steps OTB 10x3 steps BTB 10x3 steps BTB 10x3 steps black TB 10x3 steps black TB 10x3 steps black TB  10x3 steps black TB   IR walkout    10x3 steps OTB 10x3 steps BTB 10x3 steps BTB 10x3 step black 10x3 steps black TB 10x3 steps black TB 10x3 steps black TB   ER/IR        2x10 RTB active 2x10 RTB active 2x10 GTB active 2x10 BTB active   SL ER        2x10 2x10 ER, s/l abd 2x10 s/l abd   Prone rows    2x10 3# L 2x10 3# L 2x10 3# L 2x10 5# L 2x10 5# L 2x10 5# L 2x10 6# L   Prone T& I        2x10 0# 2x10 active 2x10 T   Bicep curls    2x15 3# L 2x15 3# L 2x15 3# L 2x15 5# L 2x10 5# L 2x10 5# ea 2x10 6# ea   Table slides 20 x 5\"  20 x 5\"  20 x 5\"           Shoulder raises 2 x 10 flex to 90 2 x 10 abd 2 x 10 flex to 90 2 x 10 abd 2 x 10 flex to 90 2 x 10 abd       2x10 scap to 120 deg, 2x10 abd 90+ deg   Deltoid isometrics 2 x 10   Abd, ER, ext 2 x 10   Abd, ER, ext 2 x 10   Abd, ER, ext          Wall slides             Finger ladder 10x10\" 10x10\" 10x10\"  10x10\" 10x10\" 10x10\" 10x10\" 10x10\" 10x10\" abd   AAROM w cane 2x10 flex supine, 2x10 abd stand 2x10 flex supine, 2x10 abd stand 2x10 flex supine, 2x10 abd stand 2x10 flex supine 5\", 2x10 abd stand 5\" 2x10 flex supine 5\", 2x10 abd stand 5\" 2x10 flex supine 5\", 2x10 abd stand 5\" 2x10 flex supine, 2x10 abd stand 2x10 flex supine, 2x10 abd stand 2x10 " "flex sup, 2x10 3\" abd stand slow    pulleys 6' 6' 6' 6' 3' 3' 4' 4' 5' 5'   UBE     3'/3' 3'/3' 3'/3' 3'/3' 3'/3' 3'/3'                Ther Activity 5/7 5/9 5/14 5/16 5/21 5/28  6/4  6/11   Cones          4x5 cones flex, abd 0#                Gait Training 5/7 5/9 5/14 5/21 5/28  6/4                               Modalities 5/7 5/9 5/14 5/21 5/28  6/4 6/7 6/11   ice 10' post 10' post 10' post      10' post 10' post                             "

## 2024-06-13 ENCOUNTER — OFFICE VISIT (OUTPATIENT)
Dept: PHYSICAL THERAPY | Facility: CLINIC | Age: 64
End: 2024-06-13
Payer: COMMERCIAL

## 2024-06-13 DIAGNOSIS — M75.102 TEAR OF LEFT SUPRASPINATUS TENDON: Primary | ICD-10-CM

## 2024-06-13 PROCEDURE — 97110 THERAPEUTIC EXERCISES: CPT

## 2024-06-13 PROCEDURE — 97112 NEUROMUSCULAR REEDUCATION: CPT

## 2024-06-18 ENCOUNTER — OFFICE VISIT (OUTPATIENT)
Dept: PHYSICAL THERAPY | Facility: CLINIC | Age: 64
End: 2024-06-18
Payer: COMMERCIAL

## 2024-06-18 DIAGNOSIS — M75.102 TEAR OF LEFT SUPRASPINATUS TENDON: Primary | ICD-10-CM

## 2024-06-18 PROCEDURE — 97110 THERAPEUTIC EXERCISES: CPT

## 2024-06-18 PROCEDURE — 97112 NEUROMUSCULAR REEDUCATION: CPT

## 2024-06-18 NOTE — PROGRESS NOTES
Daily Note     Today's date: 2024  Patient name: Ralph Leyva  : 1960  MRN: [id number]  Referring provider: Sergio Reid*  Dx:   Encounter Diagnosis     ICD-10-CM    1. Tear of left supraspinatus tendon  M75.102           Start Time: 1400  Stop Time: 1455  Total time in clinic (min): 55 minutes    Subjective: Pt states he is feeling good today, no new c/o pain or symptoms.      Objective: See treatment diary below      Assessment: Pt tolerated warm up on UBE well at beginning of session without increase in discomfort during or after activity. Overall, pt is showing good improvements in L shoulder strength, mobility, and ROM and would benefit from continued skilled PT to improve functional deficits. Continue to progress as able.       Plan: Continue per plan of care.  Progress treatment as tolerated.       Precautions: s/p L shoulder arthroscopic RTC repair with biceps tenodesis on 24  As per MD (24): NWB LUE in sling x 6 weeks (remove sling on 24).  Can remove Pillow at any time as it's for comfort only.  For now, may start elbow/wrist/hand range of motion. Pendulums to tolerance   Protocol in office visit with MD on 24  NO STRENGTHENING UNTIL WEEK 12  Isometrics ok, no more than coffee cup weight (2#) until 12 weeks.    Date    Visit # 31 32   25 26 27 28 29 30   FOTO          done   Re-eval               Manuals    L shoulder PROM      HY DK HY                                            Neuro Re-Ed    Row/ Ext  12# 2x15 12# 2x15   BTB 2x10 BTB 2x10 2x15 row/ext BTB 2x15 row/ext BTB nv 2x15 row/ext black TB   No monies 20x OTB 20x OTB        20x OTB                                          Pt Edu             Ther Ex    ER walkout 10x3 steps  4# 10x3 steps  5#   10x3 steps BTB 10x3 steps BTB 10x3 steps black TB  "10x3 steps black TB 10x3 steps black TB  10x3 steps black TB   IR walkout 10x3 steps 4#  10x3 steps  5#   10x3 steps BTB 10x3 steps BTB 10x3 step black 10x3 steps black TB 10x3 steps black TB 10x3 steps black TB   ER/IR  6#/4# 2x15 6#/4# 2x15     2x10 RTB active 2x10 RTB active 2x10 GTB active 2x10 BTB active   SL ER 2x10 ER, s/l abd 2x10 ER, s/l abd      2x10 2x10 ER, s/l abd 2x10 s/l abd   Prone rows 2x10 6# L 2x10 6# L   2x10 3# L 2x10 3# L 2x10 5# L 2x10 5# L 2x10 5# L 2x10 6# L   Prone T& I 2x10 active 2x10 active      2x10 0# 2x10 active 2x10 T   Bicep curls 2x10 6# ea 2x10 6# ea   2x15 3# L 2x15 3# L 2x15 5# L 2x10 5# L 2x10 5# ea 2x10 6# ea   Table slides             Shoulder raises 2x10 scap to 120 deg, 2x10 abd 90+ deg 2x10 scap to 120 deg, 2x10 abd 90+ deg        2x10 scap to 120 deg, 2x10 abd 90+ deg   Deltoid isometrics             Wall slides             Finger ladder     10x10\" 10x10\" 10x10\" 10x10\" 10x10\" 10x10\" abd   AAROM w cane     2x10 flex supine 5\", 2x10 abd stand 5\" 2x10 flex supine 5\", 2x10 abd stand 5\" 2x10 flex supine, 2x10 abd stand 2x10 flex supine, 2x10 abd stand 2x10 flex sup, 2x10 3\" abd stand slow    pulleys DC --   3' 3' 4' 4' 5' 5'   UBE 3'/3' 3'/3'   3'/3' 3'/3' 3'/3' 3'/3' 3'/3' 3'/3'                Ther Activity 6/13 6/18 5/21 5/28  6/4  6/11   Cones 4x5 cones flex, abd 0# 4x5 cones flex, abd 0#        4x5 cones flex, abd 0#                Gait Training 6/13 6/18 5/21 5/28  6/4                               Modalities 6/13 6/18 5/21 5/28 6/4 6/7 6/11   ice 10' post 10' post       10' post 10' post                             "

## 2024-06-20 ENCOUNTER — OFFICE VISIT (OUTPATIENT)
Dept: PHYSICAL THERAPY | Facility: CLINIC | Age: 64
End: 2024-06-20
Payer: COMMERCIAL

## 2024-06-20 DIAGNOSIS — M75.102 TEAR OF LEFT SUPRASPINATUS TENDON: Primary | ICD-10-CM

## 2024-06-20 PROCEDURE — 97112 NEUROMUSCULAR REEDUCATION: CPT

## 2024-06-20 PROCEDURE — 97110 THERAPEUTIC EXERCISES: CPT

## 2024-06-20 NOTE — PROGRESS NOTES
Daily Note     Today's date: 2024  Patient name: Ralph Leyva  : 1960  MRN: [id number]  Referring provider: Sergio Reid*  Dx:   Encounter Diagnosis     ICD-10-CM    1. Tear of left supraspinatus tendon  M75.102           Start Time: 1400  Stop Time: 1440  Total time in clinic (min): 40 minutes    Subjective: Pt states he is feeling good today, no new c/o pain or symptoms.      Objective: See treatment diary below      Assessment: Pt tolerated warm up on UBE well at beginning of session without increase in discomfort during or after activity. Pt performed increased weights as noted with no signs of increased pain or adverse symptoms. Pt will benefit from further skilled PT to increase strength, flexibility and function. Continue to progress as able.       Plan: Continue per plan of care.  Progress treatment as tolerated.       Precautions: s/p L shoulder arthroscopic RTC repair with biceps tenodesis on 24  As per MD (24): NWB LUE in sling x 6 weeks (remove sling on 24).  Can remove Pillow at any time as it's for comfort only.  For now, may start elbow/wrist/hand range of motion. Pendulums to tolerance   Protocol in office visit with MD on 24  NO STRENGTHENING UNTIL WEEK 12  Isometrics ok, no more than coffee cup weight (2#) until 12 weeks.    Date    Visit # 31 32 33     28 29 30   FOTO          done   Re-eval               Manuals    L shoulder PROM        HY                                            Neuro Re-Ed    Row/ Ext  12# 2x15 12# 2x15 12# 2x15     2x15 row/ext BTB nv 2x15 row/ext black TB   No monies 20x OTB 20x OTB 20x GTB       20x OTB                                          Pt Edu             Ther Ex    ER walkout 10x3 steps  4# 10x3 steps  5# 10x3 steps  5#     10x3 steps black TB 10x3 steps black TB  10x3 steps black TB   IR  "walkout 10x3 steps 4#  10x3 steps  5# 10x3 steps  5#     10x3 steps black TB 10x3 steps black TB 10x3 steps black TB   ER/IR  6#/4# 2x15 6#/4# 2x15 7#/4# 2x15     2x10 RTB active 2x10 GTB active 2x10 BTB active   SL ER 2x10 ER, s/l abd 2x10 ER, s/l abd 3# 2x10 ea      2x10 2x10 ER, s/l abd 2x10 s/l abd   Prone rows 2x10 6# L 2x10 6# L 2x10 6# L     2x10 5# L 2x10 5# L 2x10 6# L   Prone T& I 2x10 active 2x10 active 2x10 active     2x10 0# 2x10 active 2x10 T   Bicep curls 2x10 6# ea 2x10 6# ea 2x10 6# ea     2x10 5# L 2x10 5# ea 2x10 6# ea   Table slides             Shoulder raises 2x10 scap to 120 deg, 2x10 abd 90+ deg 2x10 scap to 120 deg, 2x10 abd 90+ deg 2x10 scap to 120 deg, 2x10 abd 90+ deg 2#       2x10 scap to 120 deg, 2x10 abd 90+ deg   Deltoid isometrics             Wall slides             Finger ladder        10x10\" 10x10\" 10x10\" abd   AAROM w cane        2x10 flex supine, 2x10 abd stand 2x10 flex sup, 2x10 3\" abd stand slow    pulleys DC --      4' 5' 5'   UBE 3'/3' 3'/3' 3'/3'     3'/3' 3'/3' 3'/3'                Ther Activity 6/13 6/18 6/20 6/4 6/11   Cones 4x5 cones flex, abd 0# 4x5 cones flex, abd 0# 4x5 cones flex, abd 0#       4x5 cones flex, abd 0#                Gait Training 6/13 6/18 6/20 6/4                               Modalities 6/13 6/18 6/20 6/4 6/7 6/11   ice 10' post 10' post Deferred 2/2 time      10' post 10' post                             "

## 2024-06-25 ENCOUNTER — OFFICE VISIT (OUTPATIENT)
Dept: PHYSICAL THERAPY | Facility: CLINIC | Age: 64
End: 2024-06-25
Payer: COMMERCIAL

## 2024-06-25 DIAGNOSIS — M75.102 TEAR OF LEFT SUPRASPINATUS TENDON: Primary | ICD-10-CM

## 2024-06-25 PROCEDURE — 97112 NEUROMUSCULAR REEDUCATION: CPT

## 2024-06-25 PROCEDURE — 97110 THERAPEUTIC EXERCISES: CPT

## 2024-06-25 NOTE — PROGRESS NOTES
Daily Note     Today's date: 2024  Patient name: Ralph Leyva  : 1960  MRN: [id number]  Referring provider: Sergio Reid*  Dx:   Encounter Diagnosis     ICD-10-CM    1. Tear of left supraspinatus tendon  M75.102           Start Time: 1355  Stop Time: 1440  Total time in clinic (min): 45 minutes    Subjective: Pt states he is feeling good today, no new c/o pain or symptoms.      Objective: See treatment diary below      Assessment: Pt tolerated today's session well with no adverse symptoms. Pt continues to work towards goals of increased shoulder strength and function. Pt performed increased weight with cone lifts. Pt will benefit from further skilled PT to increase strength, flexibility and function. Continue to progress as able.       Plan: Continue per plan of care.  Progress treatment as tolerated.       Precautions: s/p L shoulder arthroscopic RTC repair with biceps tenodesis on 24  As per MD (24): NWB LUE in sling x 6 weeks (remove sling on 24).  Can remove Pillow at any time as it's for comfort only.  For now, may start elbow/wrist/hand range of motion. Pendulums to tolerance   Protocol in office visit with MD on 24  NO STRENGTHENING UNTIL WEEK 12  Isometrics ok, no more than coffee cup weight (2#) until 12 weeks.    Date    Visit # 31 32 33 34    28 29 30   FOTO          done   Re-eval               Manuals    L shoulder PROM        HY                                            Neuro Re-Ed    Row/ Ext  12# 2x15 12# 2x15 12# 2x15 12# 2x15    2x15 row/ext BTB nv 2x15 row/ext black TB   No monies 20x OTB 20x OTB 20x GTB 20x GTB      20x OTB                                          Pt Edu             Ther Ex    ER walkout 10x3 steps  4# 10x3 steps  5# 10x3 steps  5# 10x3 steps  5#    10x3 steps black TB 10x3 steps black TB   "10x3 steps black TB   IR walkout 10x3 steps 4#  10x3 steps  5# 10x3 steps  5# 10x3 steps  5#    10x3 steps black TB 10x3 steps black TB 10x3 steps black TB   ER/IR  6#/4# 2x15 6#/4# 2x15 7#/4# 2x15 7#/4# 2x15    2x10 RTB active 2x10 GTB active 2x10 BTB active   SL ER 2x10 ER, s/l abd 2x10 ER, s/l abd 3# 2x10 ea  3# 2x10 ea     2x10 2x10 ER, s/l abd 2x10 s/l abd   Prone rows 2x10 6# L 2x10 6# L 2x10 6# L 2x10 6# L    2x10 5# L 2x10 5# L 2x10 6# L   Prone T& I 2x10 active 2x10 active 2x10 active 2x10 active    2x10 0# 2x10 active 2x10 T   Bicep curls 2x10 6# ea 2x10 6# ea 2x10 6# ea 3x10 6# ea    2x10 5# L 2x10 5# ea 2x10 6# ea   Table slides             Shoulder raises 2x10 scap to 120 deg, 2x10 abd 90+ deg 2x10 scap to 120 deg, 2x10 abd 90+ deg 2x10 scap to 120 deg, 2x10 abd 90+ deg 2# 2x10 scap to 120 deg, 2x10 abd 90+ deg 2#      2x10 scap to 120 deg, 2x10 abd 90+ deg   Deltoid isometrics             Wall slides             Finger ladder        10x10\" 10x10\" 10x10\" abd   AAROM w cane        2x10 flex supine, 2x10 abd stand 2x10 flex sup, 2x10 3\" abd stand slow    pulleys DC --      4' 5' 5'   UBE 3'/3' 3'/3' 3'/3' 3'/3'    3'/3' 3'/3' 3'/3'                Ther Activity 6/13 6/18 6/20 6/25 6/4 6/11   Cones 4x5 cones flex, abd 0# 4x5 cones flex, abd 0# 4x5 cones flex, abd 0# 2-2#, 2-1# 4x5      4x5 cones flex, abd 0#                Gait Training 6/13 6/18 6/20 6/25 6/4                               Modalities 6/13 6/18 6/20 6/25 6/4 6/7 6/11   ice 10' post 10' post Deferred 2/2 time 10' post      10' post 10' post                             "

## 2024-06-27 ENCOUNTER — OFFICE VISIT (OUTPATIENT)
Dept: PHYSICAL THERAPY | Facility: CLINIC | Age: 64
End: 2024-06-27
Payer: COMMERCIAL

## 2024-06-27 DIAGNOSIS — M75.102 TEAR OF LEFT SUPRASPINATUS TENDON: Primary | ICD-10-CM

## 2024-06-27 PROCEDURE — 97110 THERAPEUTIC EXERCISES: CPT

## 2024-06-27 PROCEDURE — 97112 NEUROMUSCULAR REEDUCATION: CPT

## 2024-06-27 NOTE — PROGRESS NOTES
Daily Note     Today's date: 2024  Patient name: Ralph Leyva  : 1960  MRN: [id number]  Referring provider: Sergio Reid*  Dx:   Encounter Diagnosis     ICD-10-CM    1. Tear of left supraspinatus tendon  M75.102           Start Time: 1405  Stop Time: 1500  Total time in clinic (min): 55 minutes    Subjective: Pt states he is feeling good today, no new c/o pain or symptoms.      Objective: See treatment diary below      Assessment: Pt tolerated today's session well with no adverse symptoms. Pt continues to work towards goals of increased shoulder strength and function. Pt performed new exercises as noted with no signs of increased pain or adverse symptoms. Pt will benefit from further skilled PT to increase strength, flexibility and function. Continue to progress as able.       Plan: Continue per plan of care.  Progress treatment as tolerated.       Precautions: s/p L shoulder arthroscopic RTC repair with biceps tenodesis on 24  As per MD (24): NWB LUE in sling x 6 weeks (remove sling on 24).  Can remove Pillow at any time as it's for comfort only.  For now, may start elbow/wrist/hand range of motion. Pendulums to tolerance   Protocol in office visit with MD on 24  NO STRENGTHENING UNTIL WEEK 12  Isometrics ok, no more than coffee cup weight (2#) until 12 weeks.    Date    Visit # 31 32 33 34 35   28 29 30   FOTO          done   Re-eval               Manuals    L shoulder PROM        HY                                            Neuro Re-Ed    Row/ Ext  12# 2x15 12# 2x15 12# 2x15 12# 2x15 12# 2x15   2x15 row/ext BTB nv 2x15 row/ext black TB   No monies 20x OTB 20x OTB 20x GTB 20x GTB 20x GTB     20x OTB                                          Pt Edu             Ther Ex    ER walkout 10x3 steps  4# 10x3 steps  5#  "10x3 steps  5# 10x3 steps  5# 10x3 steps  5#   10x3 steps black TB 10x3 steps black TB  10x3 steps black TB   IR walkout 10x3 steps 4#  10x3 steps  5# 10x3 steps  5# 10x3 steps  5# 10x3 steps  5#   10x3 steps black TB 10x3 steps black TB 10x3 steps black TB   ER/IR  6#/4# 2x15 6#/4# 2x15 7#/4# 2x15 7#/4# 2x15 7#/4# 2x15   2x10 RTB active 2x10 GTB active 2x10 BTB active   SL ER 2x10 ER, s/l abd 2x10 ER, s/l abd 3# 2x10 ea  3# 2x10 ea  3# 2x10 ea    2x10 2x10 ER, s/l abd 2x10 s/l abd   Prone rows 2x10 6# L 2x10 6# L 2x10 6# L 2x10 6# L 2x10 6# L   2x10 5# L 2x10 5# L 2x10 6# L   Prone T& I 2x10 active 2x10 active 2x10 active 2x10 active 2x10 active   2x10 0# 2x10 active 2x10 T   Bicep curls 2x10 6# ea 2x10 6# ea 2x10 6# ea 3x10 6# ea 3x10 6# ea   2x10 5# L 2x10 5# ea 2x10 6# ea   Table slides             Shoulder raises 2x10 scap to 120 deg, 2x10 abd 90+ deg 2x10 scap to 120 deg, 2x10 abd 90+ deg 2x10 scap to 120 deg, 2x10 abd 90+ deg 2# 2x10 scap to 120 deg, 2x10 abd 90+ deg 2# 2x10 scap to 120 deg, 2x10 abd 90+ deg 3#     2x10 scap to 120 deg, 2x10 abd 90+ deg   Deltoid isometrics             Wall slides             Finger ladder        10x10\" 10x10\" 10x10\" abd   AAROM w cane        2x10 flex supine, 2x10 abd stand 2x10 flex sup, 2x10 3\" abd stand slow    pulleys DC --      4' 5' 5'   UBE 3'/3' 3'/3' 3'/3' 3'/3' 3'/3'   3'/3' 3'/3' 3'/3'                Ther Activity 6/13 6/18 6/20 6/25 6/27 6/4 6/11   Cones 4x5 cones flex, abd 0# 4x5 cones flex, abd 0# 4x5 cones flex, abd 0# 2-2#, 2-1# 4x5 2-2#, 2-1#, 1-2.5# 4x5     4x5 cones flex, abd 0#                Gait Training 6/13 6/18 6/20 6/25 6/27   6/4                               Modalities 6/13 6/18 6/20 6/25 6/27 6/4 6/7 6/11   ice 10' post 10' post Deferred 2/2 time 10' post  10' post     10' post 10' post                             "

## 2024-07-02 ENCOUNTER — OFFICE VISIT (OUTPATIENT)
Dept: PHYSICAL THERAPY | Facility: CLINIC | Age: 64
End: 2024-07-02
Payer: COMMERCIAL

## 2024-07-02 DIAGNOSIS — M75.102 TEAR OF LEFT SUPRASPINATUS TENDON: Primary | ICD-10-CM

## 2024-07-02 PROCEDURE — 97112 NEUROMUSCULAR REEDUCATION: CPT

## 2024-07-02 PROCEDURE — 97110 THERAPEUTIC EXERCISES: CPT

## 2024-07-02 NOTE — PROGRESS NOTES
Daily Note     Today's date: 2024  Patient name: Ralph Leyva  : 1960  MRN: [id number]  Referring provider: Sergio Reid*  Dx:   Encounter Diagnosis     ICD-10-CM    1. Tear of left supraspinatus tendon  M75.102           Start Time: 1455  Stop Time: 1535  Total time in clinic (min): 40 minutes    Subjective: Pt stated that he is doing well today with no issues noted in the L shoulder.       Objective: See treatment diary below      Assessment: Pt tolerated warm up on UBE well at beginning of session without increase in discomfort during or after activity. Pt was challenged appropriately with progression of resistance with functional reaching with weighted cones with mild fatigue post performance but demonstrated good ROM and control with lifting and placing. Pt required mild verbal cueing during session to take proper breaks between sets and different exercises as pt demonstrates tendency to perform activities past fatigue and shows compensations when fatigued. Pt was challenged with addition of resistance with prone L shoulder strengthening activities with mild fatigue post performance. Pt tolerated application of ice at end of session well with reported decrease in discomfort post modality. Pt would benefit from continued skilled PT to improve L shoulder mobility, ROM, strength, flexibility, and functional ability.     Plan: Continue per plan of care.  Progress treatment as tolerated.       Precautions: s/p L shoulder arthroscopic RTC repair with biceps tenodesis on 24  As per MD (24): NWB LUE in sling x 6 weeks (remove sling on 24).  Can remove Pillow at any time as it's for comfort only.  For now, may start elbow/wrist/hand range of motion. Pendulums to tolerance   Protocol in office visit with MD on 24  NO STRENGTHENING UNTIL WEEK 12  Isometrics ok, no more than coffee cup weight (2#) until 12 weeks.    Date    Visit #  "31 32 33 34 35 36  28 29 30   FOTO          done   Re-eval               Manuals 6/13 6/18 6/20 6/25 6/27 7/2 6/4 6/7 6/11   L shoulder PROM        HY                                            Neuro Re-Ed 6/13 6/18 6/20 6/25 6/27 7/2 6/4 6/7 6/11   Row/ Ext  12# 2x15 12# 2x15 12# 2x15 12# 2x15 12# 2x15 2x15 13#   2x15 row/ext BTB nv 2x15 row/ext black TB   No monies 20x OTB 20x OTB 20x GTB 20x GTB 20x GTB 20x GTB    20x OTB                                          Pt Edu             Ther Ex 6/13 6/18 6/20 6/25 6/27 7/2 6/4 6/7 6/11   ER walkout 10x3 steps  4# 10x3 steps  5# 10x3 steps  5# 10x3 steps  5# 10x3 steps  5#   10x3 steps black TB 10x3 steps black TB  10x3 steps black TB   IR walkout 10x3 steps 4#  10x3 steps  5# 10x3 steps  5# 10x3 steps  5# 10x3 steps  5#   10x3 steps black TB 10x3 steps black TB 10x3 steps black TB   ER/IR  6#/4# 2x15 6#/4# 2x15 7#/4# 2x15 7#/4# 2x15 7#/4# 2x15 7#/7# 2x15   2x10 RTB active 2x10 GTB active 2x10 BTB active   SL ER 2x10 ER, s/l abd 2x10 ER, s/l abd 3# 2x10 ea  3# 2x10 ea  3# 2x10 ea  2x10 3#  2x10 2x10 ER, s/l abd 2x10 s/l abd   Prone rows 2x10 6# L 2x10 6# L 2x10 6# L 2x10 6# L 2x10 6# L 2x10 6# L   2x10 5# L 2x10 5# L 2x10 6# L   Prone T& I 2x10 active 2x10 active 2x10 active 2x10 active 2x10 active 2x10 3# T, Y, I  2x10 0# 2x10 active 2x10 T   Bicep curls 2x10 6# ea 2x10 6# ea 2x10 6# ea 3x10 6# ea 3x10 6# ea 3x10 6# ea  2x10 5# L 2x10 5# ea 2x10 6# ea   Table slides             Shoulder raises 2x10 scap to 120 deg, 2x10 abd 90+ deg 2x10 scap to 120 deg, 2x10 abd 90+ deg 2x10 scap to 120 deg, 2x10 abd 90+ deg 2# 2x10 scap to 120 deg, 2x10 abd 90+ deg 2# 2x10 scap to 120 deg, 2x10 abd 90+ deg 3# 2x10 3# flex, abd    2x10 scap to 120 deg, 2x10 abd 90+ deg   Offering raises      2x10 OTB       Deltoid isometrics             Wall slides             Finger ladder        10x10\" 10x10\" 10x10\" abd   AAROM w cane        2x10 flex supine, 2x10 abd stand 2x10 flex sup, 2x10 3\" " abd stand slow    pulleys DC --      4' 5' 5'   UBE 3'/3' 3'/3' 3'/3' 3'/3' 3'/3' 3'/3'  3'/3' 3'/3' 3'/3'                Ther Activity 6/13 6/18 6/20 6/25 6/27 7/2  6/4 6/11   Cones 4x5 cones flex, abd 0# 4x5 cones flex, abd 0# 4x5 cones flex, abd 0# 2-2#, 2-1# 4x5 2-2#, 2-1#, 1-2.5# 4x5 4x5 cones: 3: 3#  2: 4#    4x5 cones flex, abd 0#                Gait Training 6/13 6/18 6/20 6/25 6/27 6/4                               Modalities 6/13 6/18 6/20 6/25 6/27 7/2 6/4 6/7 6/11   ice 10' post 10' post Deferred 2/2 time 10' post  10' post  10' post   10' post 10' post

## 2024-07-09 ENCOUNTER — OFFICE VISIT (OUTPATIENT)
Dept: PHYSICAL THERAPY | Facility: CLINIC | Age: 64
End: 2024-07-09
Payer: COMMERCIAL

## 2024-07-09 DIAGNOSIS — M75.102 TEAR OF LEFT SUPRASPINATUS TENDON: Primary | ICD-10-CM

## 2024-07-09 PROCEDURE — 97112 NEUROMUSCULAR REEDUCATION: CPT

## 2024-07-09 PROCEDURE — 97010 HOT OR COLD PACKS THERAPY: CPT

## 2024-07-09 PROCEDURE — 97110 THERAPEUTIC EXERCISES: CPT

## 2024-07-09 NOTE — PROGRESS NOTES
Daily Note     Today's date: 2024  Patient name: Ralph Leyva  : 1960  MRN: [id number]  Referring provider: Sergio Reid*  Dx:   Encounter Diagnosis     ICD-10-CM    1. Tear of left supraspinatus tendon  M75.102           Start Time: 1400  Stop Time: 1458  Total time in clinic (min): 58 minutes    Subjective: Pt stated that he has been doing very well lately and only really has issues with resisted motion out to the side and reaching higher up behind his back but is otherwise feeling good.       Objective: See treatment diary below      Assessment: Pt tolerated warm up on UBE well at beginning of session without increase in discomfort during or after activity. Pt tolerated progression of repetitions with functional reaching activity with weighted cones, but also tolerated substantial increase in reaching height to highest shelf today with weighted cones without discomfort or soreness post performance. Discussed current PT POC with pt today, as pt reported that he feels that he only has mild deficits now, he would be open to transitioning to HEP over the next couple weeks with updated HEP, agreed with pt. Pt tolerated application of ice at end of session well with reported decrease in discomfort post modality. Pt would benefit from continued skilled PT to improve L shoulder strength, mobility, ROM, flexibility, scapular stability, and functional ability.     Plan: Continue per plan of care.  Progress treatment as tolerated.       Precautions: s/p L shoulder arthroscopic RTC repair with biceps tenodesis on 24  As per MD (24): NWB LUE in sling x 6 weeks (remove sling on 24).  Can remove Pillow at any time as it's for comfort only.  For now, may start elbow/wrist/hand range of motion. Pendulums to tolerance   Protocol in office visit with MD on 24  NO STRENGTHENING UNTIL WEEK 12  Isometrics ok, no more than coffee cup weight (2#) until 12 weeks.    Date   6/25 6/27 7/2 7/9      Visit # 31 32 33 34 35 36 37      FOTO             Re-eval               Manuals 6/13 6/18 6/20 6/25 6/27 7/2 7/9      L shoulder PROM                                                    Neuro Re-Ed 6/13 6/18 6/20 6/25 6/27 7/2 7/9      Row/ Ext  12# 2x15 12# 2x15 12# 2x15 12# 2x15 12# 2x15 2x15 13#  3x15 18#       No monies 20x OTB 20x OTB 20x GTB 20x GTB 20x GTB 20x GTB 20x GTB                                             Pt Edu             Ther Ex 6/13 6/18 6/20 6/25 6/27 7/2 7/9      ER walkout 10x3 steps  4# 10x3 steps  5# 10x3 steps  5# 10x3 steps  5# 10x3 steps  5#        IR walkout 10x3 steps 4#  10x3 steps  5# 10x3 steps  5# 10x3 steps  5# 10x3 steps  5#        ER/IR  6#/4# 2x15 6#/4# 2x15 7#/4# 2x15 7#/4# 2x15 7#/4# 2x15 7#/7# 2x15  2x15 6#/7#      SL ER 2x10 ER, s/l abd 2x10 ER, s/l abd 3# 2x10 ea  3# 2x10 ea  3# 2x10 ea  2x10 3# 2x10 6#      Prone rows 2x10 6# L 2x10 6# L 2x10 6# L 2x10 6# L 2x10 6# L 2x10 6# L  2x10 6# L      Prone T& I 2x10 active 2x10 active 2x10 active 2x10 active 2x10 active 2x10 3# T, Y, I 2x10 6# T, Y, I      Bicep curls 2x10 6# ea 2x10 6# ea 2x10 6# ea 3x10 6# ea 3x10 6# ea 3x10 6# ea 2x15 8# ea      Table slides             Shoulder raises 2x10 scap to 120 deg, 2x10 abd 90+ deg 2x10 scap to 120 deg, 2x10 abd 90+ deg 2x10 scap to 120 deg, 2x10 abd 90+ deg 2# 2x10 scap to 120 deg, 2x10 abd 90+ deg 2# 2x10 scap to 120 deg, 2x10 abd 90+ deg 3# 2x10 3# flex, abd 2x15 3# flex, abd      Offering raises      2x10 OTB 3x10 PTB       Deltoid isometrics             Wall slides             Finger ladder             AAROM w cane             pulleys DC --           UBE 3'/3' 3'/3' 3'/3' 3'/3' 3'/3' 3'/3' 3'/3'                   Ther Activity 6/13 6/18 6/20 6/25 6/27 7/2 7/9      Cones 4x5 cones flex, abd 0# 4x5 cones flex, abd 0# 4x5 cones flex, abd 0# 2-2#, 2-1# 4x5 2-2#, 2-1#, 1-2.5# 4x5 4x5 cones: 3: 3#  2: 4# 5x5 cones 3: 3#, 2: 4#, highest shelf 3x5 3: 3#, 2: 4#                    Gait Training 6/13 6/18 6/20 6/25 6/27                                  Modalities 6/13 6/18 6/20 6/25 6/27 7/2 7/9      ice 10' post 10' post Deferred 2/2 time 10' post  10' post  10' post 10' post

## 2024-07-11 ENCOUNTER — OFFICE VISIT (OUTPATIENT)
Dept: PHYSICAL THERAPY | Facility: CLINIC | Age: 64
End: 2024-07-11
Payer: COMMERCIAL

## 2024-07-11 DIAGNOSIS — M75.102 TEAR OF LEFT SUPRASPINATUS TENDON: Primary | ICD-10-CM

## 2024-07-11 PROCEDURE — 97110 THERAPEUTIC EXERCISES: CPT

## 2024-07-11 NOTE — PROGRESS NOTES
Daily Note     Today's date: 2024  Patient name: Ralph Leyva  : 1960  MRN: [id number]  Referring provider: Sergio Reid*  Dx:   Encounter Diagnosis     ICD-10-CM    1. Tear of left supraspinatus tendon  M75.102           Start Time: 1400  Stop Time: 1450  Total time in clinic (min): 50 minutes    Subjective: Pt stated that he has been doing very well lately. No new c/o pain or symptoms.        Objective: See treatment diary below      Assessment: Pt tolerated warm up on UBE well at beginning of session without increase in discomfort during or after activity.  Pt continues to work towards goals of increased shoulder strength and ROM. Pt would benefit from continued skilled PT to improve L shoulder strength, mobility, ROM, flexibility, scapular stability, and functional ability. Continue to progress as able.     Plan: Continue per plan of care.  Progress treatment as tolerated.       Precautions: s/p L shoulder arthroscopic RTC repair with biceps tenodesis on 24  As per MD (24): NWB LUE in sling x 6 weeks (remove sling on 24).  Can remove Pillow at any time as it's for comfort only.  For now, may start elbow/wrist/hand range of motion. Pendulums to tolerance   Protocol in office visit with MD on 24  NO STRENGTHENING UNTIL WEEK 12  Isometrics ok, no more than coffee cup weight (2#) until 12 weeks.    Date      Visit # 31 32 33 34 35 36 37 38     FOTO             Re-eval               Manuals      L shoulder PROM                                                    Neuro Re-Ed      Row/ Ext  12# 2x15 12# 2x15 12# 2x15 12# 2x15 12# 2x15 2x15 13#  3x15 18#  3x15 24#      No monies 20x OTB 20x OTB 20x GTB 20x GTB 20x GTB 20x GTB 20x GTB 20x GTB                                            Pt Edu             Ther Ex 6     ER walkout  10x3 steps  4# 10x3 steps  5# 10x3 steps  5# 10x3 steps  5# 10x3 steps  5#        IR walkout 10x3 steps 4#  10x3 steps  5# 10x3 steps  5# 10x3 steps  5# 10x3 steps  5#        ER/IR  6#/4# 2x15 6#/4# 2x15 7#/4# 2x15 7#/4# 2x15 7#/4# 2x15 7#/7# 2x15  2x15 6#/7# 2x15 6#/7#     SL ER 2x10 ER, s/l abd 2x10 ER, s/l abd 3# 2x10 ea  3# 2x10 ea  3# 2x10 ea  2x10 3# 2x10 6# 2x10 6#     Prone rows 2x10 6# L 2x10 6# L 2x10 6# L 2x10 6# L 2x10 6# L 2x10 6# L  2x10 6# L 2x10 6# L     Prone T& I 2x10 active 2x10 active 2x10 active 2x10 active 2x10 active 2x10 3# T, Y, I 2x10 6# T, Y, I 2x10 6# T, Y, I     Bicep curls 2x10 6# ea 2x10 6# ea 2x10 6# ea 3x10 6# ea 3x10 6# ea 3x10 6# ea 2x15 8# ea 2x15 8# ea     Table slides             Shoulder raises 2x10 scap to 120 deg, 2x10 abd 90+ deg 2x10 scap to 120 deg, 2x10 abd 90+ deg 2x10 scap to 120 deg, 2x10 abd 90+ deg 2# 2x10 scap to 120 deg, 2x10 abd 90+ deg 2# 2x10 scap to 120 deg, 2x10 abd 90+ deg 3# 2x10 3# flex, abd 2x15 3# flex, abd 2x15 3# flex, abd     Offering raises      2x10 OTB 3x10 PTB  3x10 PTB      Deltoid isometrics             Wall slides             Finger ladder             AAROM w cane             pulleys DC --           UBE 3'/3' 3'/3' 3'/3' 3'/3' 3'/3' 3'/3' 3'/3' 3'/3'     Tmill warmup/cooldown        nv     Ther Activity 6/13 6/18 6/20 6/25 6/27 7/2 7/9 7/11     Cones 4x5 cones flex, abd 0# 4x5 cones flex, abd 0# 4x5 cones flex, abd 0# 2-2#, 2-1# 4x5 2-2#, 2-1#, 1-2.5# 4x5 4x5 cones: 3: 3#  2: 4# 5x5 cones 3: 3#, 2: 4#, highest shelf 3x5 3: 3#, 2: 4# 5x5 cones 3: 3#, 2: 4#, highest shelf 3x5 3: 3#, 2: 4#                  Gait Training 6/13 6/18 6/20 6/25 6/27 7/11                               Modalities 6/13 6/18 6/20 6/25 6/27 7/2 7/9 7/11     ice 10' post 10' post Deferred 2/2 time 10' post  10' post  10' post 10' post 10' post

## 2024-07-16 ENCOUNTER — OFFICE VISIT (OUTPATIENT)
Dept: PHYSICAL THERAPY | Facility: CLINIC | Age: 64
End: 2024-07-16
Payer: COMMERCIAL

## 2024-07-16 DIAGNOSIS — M75.102 TEAR OF LEFT SUPRASPINATUS TENDON: Primary | ICD-10-CM

## 2024-07-16 PROCEDURE — 97112 NEUROMUSCULAR REEDUCATION: CPT

## 2024-07-16 PROCEDURE — 97010 HOT OR COLD PACKS THERAPY: CPT

## 2024-07-16 PROCEDURE — 97110 THERAPEUTIC EXERCISES: CPT

## 2024-07-16 NOTE — PROGRESS NOTES
Daily Note     Today's date: 2024  Patient name: Ralph Leyva  : 1960  MRN: [id number]  Referring provider: Sergio Reid*  Dx:   Encounter Diagnosis     ICD-10-CM    1. Tear of left supraspinatus tendon  M75.102           Start Time: 1400  Stop Time: 1500  Total time in clinic (min): 60 minutes    Subjective: Pt stated that he is doing very well today without any new complaints of pain or discomfort.       Objective: See treatment diary below      Assessment: Pt tolerated warm up on UBE well at beginning of session without increase in discomfort during or after activity. Pt was challenged appropriately with progression of resistance with most activities throughout session. Pt required mild verbal cueing to take breaks between sets and exercises as he has tendency to push things too far throughout session. Pt tolerated application of ice at end of session well with reported decrease in discomfort post modality. Pt would benefit from continued skilled PT to improve L shoulder strength, mobility, scapular stability, ROM, and functional ability.       Plan: Continue per plan of care.  Progress treatment as tolerated.       Precautions: s/p L shoulder arthroscopic RTC repair with biceps tenodesis on 24  As per MD (24): NWB LUE in sling x 6 weeks (remove sling on 24).  Can remove Pillow at any time as it's for comfort only.  For now, may start elbow/wrist/hand range of motion. Pendulums to tolerance   Protocol in office visit with MD on 24  NO STRENGTHENING UNTIL WEEK 12  Isometrics ok, no more than coffee cup weight (2#) until 12 weeks.    Date     Visit # 31 32 33 34 35 36 37 38 39    FOTO             Re-eval               Manuals     L shoulder PROM                                                    Neuro Re-Ed     Row/ Ext  12# 2x15 12# 2x15 12#  2x15 12# 2x15 12# 2x15 2x15 13#  3x15 18#  3x15 24#  2x15 24#     No monies 20x OTB 20x OTB 20x GTB 20x GTB 20x GTB 20x GTB 20x GTB 20x GTB 20x BTB                                           Pt Edu             Ther Ex 6/13 6/18 6/20 6/25 6/27 7/2 7/9 7/11 7/16    ER walkout 10x3 steps  4# 10x3 steps  5# 10x3 steps  5# 10x3 steps  5# 10x3 steps  5#        IR walkout 10x3 steps 4#  10x3 steps  5# 10x3 steps  5# 10x3 steps  5# 10x3 steps  5#        ER/IR  6#/4# 2x15 6#/4# 2x15 7#/4# 2x15 7#/4# 2x15 7#/4# 2x15 7#/7# 2x15  2x15 6#/7# 2x15 6#/7#     SL ER 2x10 ER, s/l abd 2x10 ER, s/l abd 3# 2x10 ea  3# 2x10 ea  3# 2x10 ea  2x10 3# 2x10 6# 2x10 6# 2x10 6#     Prone rows 2x10 6# L 2x10 6# L 2x10 6# L 2x10 6# L 2x10 6# L 2x10 6# L  2x10 6# L 2x10 6# L 2x10 6# L    Prone T& I 2x10 active 2x10 active 2x10 active 2x10 active 2x10 active 2x10 3# T, Y, I 2x10 6# T, Y, I 2x10 6# T, Y, I 2x10 6# T, Y, I    Bicep curls 2x10 6# ea 2x10 6# ea 2x10 6# ea 3x10 6# ea 3x10 6# ea 3x10 6# ea 2x15 8# ea 2x15 8# ea 2x15 10# ea    Table slides             Shoulder raises 2x10 scap to 120 deg, 2x10 abd 90+ deg 2x10 scap to 120 deg, 2x10 abd 90+ deg 2x10 scap to 120 deg, 2x10 abd 90+ deg 2# 2x10 scap to 120 deg, 2x10 abd 90+ deg 2# 2x10 scap to 120 deg, 2x10 abd 90+ deg 3# 2x10 3# flex, abd 2x15 3# flex, abd 2x15 3# flex, abd 2x10 6# flex, abd    Offering raises      2x10 OTB 3x10 PTB  3x10 PTB  3x10 GTB    Deltoid isometrics             Wall slides             Finger ladder             AAROM w cane             pulleys DC --           UBE 3'/3' 3'/3' 3'/3' 3'/3' 3'/3' 3'/3' 3'/3' 3'/3' 3'/3'    Tmill warmup/cooldown        nv 10' post    Ther Activity 6/13 6/18 6/20 6/25 6/27 7/2 7/9 7/11 7/16    Cones 4x5 cones flex, abd 0# 4x5 cones flex, abd 0# 4x5 cones flex, abd 0# 2-2#, 2-1# 4x5 2-2#, 2-1#, 1-2.5# 4x5 4x5 cones: 3: 3#  2: 4# 5x5 cones 3: 3#, 2: 4#, highest shelf 3x5 3: 3#, 2: 4# 5x5 cones 3: 3#, 2: 4#, highest shelf 3x5 3: 3#, 2: 4# 5x5  cones 1: 3#, 2: 4#. 2: 5# highest shelf                 Gait Training 6/13 6/18 6/20 6/25 6/27 7/11                               Modalities 6/13 6/18 6/20 6/25 6/27 7/2 7/9 7/11 7/16    ice 10' post 10' post Deferred 2/2 time 10' post  10' post  10' post 10' post 10' post 10' post

## 2024-07-18 ENCOUNTER — OFFICE VISIT (OUTPATIENT)
Dept: PHYSICAL THERAPY | Facility: CLINIC | Age: 64
End: 2024-07-18
Payer: COMMERCIAL

## 2024-07-18 DIAGNOSIS — M75.102 TEAR OF LEFT SUPRASPINATUS TENDON: Primary | ICD-10-CM

## 2024-07-18 PROCEDURE — 97112 NEUROMUSCULAR REEDUCATION: CPT

## 2024-07-18 PROCEDURE — 97110 THERAPEUTIC EXERCISES: CPT

## 2024-07-18 PROCEDURE — 97010 HOT OR COLD PACKS THERAPY: CPT

## 2024-07-18 NOTE — PROGRESS NOTES
Daily Note     Today's date: 2024  Patient name: Ralph Leyva  : 1960  MRN: [id number]  Referring provider: Sergio Reid*  Dx:   Encounter Diagnosis     ICD-10-CM    1. Tear of left supraspinatus tendon  M75.102           Start Time: 1400  Stop Time: 1450  Total time in clinic (min): 50 minutes    Subjective: Pt stated that he is doing well and does not have any discomfort or pain.      Objective: See treatment diary below      Assessment: Pt tolerated warm up on treadmill and UBE well at beginning of session without increase in discomfort during or after activity. Pt was challenged appropriately with progression of reaching with increased weights to highest shelf with functional reaching activity. Pt demonstrated slightly increased fatigue today compared to last visit and could not complete shoulder abduction raises due to fatigue, but had no pain with performance. Pt tolerated application of ice at end of session well with reported decrease in discomfort post modality. Pt would benefit from continued skilled PT to improve L shoulder strength, mobility, flexibility, scapular stability, and functional ability.       Plan: Continue per plan of care.  Progress treatment as tolerated.       Precautions: s/p L shoulder arthroscopic RTC repair with biceps tenodesis on 24  As per MD (24): NWB LUE in sling x 6 weeks (remove sling on 24).  Can remove Pillow at any time as it's for comfort only.  For now, may start elbow/wrist/hand range of motion. Pendulums to tolerance   Protocol in office visit with MD on 24  NO STRENGTHENING UNTIL WEEK 12  Isometrics ok, no more than coffee cup weight (2#) until 12 weeks.    Date    Visit # 31 32 33 34 35 36 37 38 39 40   FOTO             Re-eval               Manuals    L shoulder PROM                                                    Neuro  Re-Ed 6/13 6/18 6/20 6/25 6/27 7/2 7/9 7/11 7/16 7/18   Row/ Ext  12# 2x15 12# 2x15 12# 2x15 12# 2x15 12# 2x15 2x15 13#  3x15 18#  3x15 24#  2x15 24#  2x15 25#   No monies 20x OTB 20x OTB 20x GTB 20x GTB 20x GTB 20x GTB 20x GTB 20x GTB 20x BTB 30x BTB                                           Pt Edu             Ther Ex 6/13 6/18 6/20 6/25 6/27 7/2 7/9 7/11 7/16 7/18   ER walkout 10x3 steps  4# 10x3 steps  5# 10x3 steps  5# 10x3 steps  5# 10x3 steps  5#        IR walkout 10x3 steps 4#  10x3 steps  5# 10x3 steps  5# 10x3 steps  5# 10x3 steps  5#        ER/IR  6#/4# 2x15 6#/4# 2x15 7#/4# 2x15 7#/4# 2x15 7#/4# 2x15 7#/7# 2x15  2x15 6#/7# 2x15 6#/7#  2x15 7#/8#   SL ER 2x10 ER, s/l abd 2x10 ER, s/l abd 3# 2x10 ea  3# 2x10 ea  3# 2x10 ea  2x10 3# 2x10 6# 2x10 6# 2x10 6#  2x10 6#   Prone rows 2x10 6# L 2x10 6# L 2x10 6# L 2x10 6# L 2x10 6# L 2x10 6# L  2x10 6# L 2x10 6# L 2x10 6# L 2x10 6#   Prone T& I 2x10 active 2x10 active 2x10 active 2x10 active 2x10 active 2x10 3# T, Y, I 2x10 6# T, Y, I 2x10 6# T, Y, I 2x10 6# T, Y, I 2x10 6# T,I,Y   Bicep curls 2x10 6# ea 2x10 6# ea 2x10 6# ea 3x10 6# ea 3x10 6# ea 3x10 6# ea 2x15 8# ea 2x15 8# ea 2x15 10# ea 2x15 10# ea   Table slides             Shoulder raises 2x10 scap to 120 deg, 2x10 abd 90+ deg 2x10 scap to 120 deg, 2x10 abd 90+ deg 2x10 scap to 120 deg, 2x10 abd 90+ deg 2# 2x10 scap to 120 deg, 2x10 abd 90+ deg 2# 2x10 scap to 120 deg, 2x10 abd 90+ deg 3# 2x10 3# flex, abd 2x15 3# flex, abd 2x15 3# flex, abd 2x10 6# flex, abd 2x15 6# flex, abd*   Offering raises      2x10 OTB 3x10 PTB  3x10 PTB  3x10 GTB    Deltoid isometrics             Wall slides             Finger ladder             AAROM w cane             pulleys DC --           UBE 3'/3' 3'/3' 3'/3' 3'/3' 3'/3' 3'/3' 3'/3' 3'/3' 3'/3' 3'/3'   Tmill warmup/cooldown        nv 10' post 10' pre   Ther Activity 6/13 6/18 6/20 6/25 6/27 7/2 7/9 7/11 7/16 7/18   Cones 4x5 cones flex, abd 0# 4x5 cones flex, abd 0# 4x5 cones  flex, abd 0# 2-2#, 2-1# 4x5 2-2#, 2-1#, 1-2.5# 4x5 4x5 cones: 3: 3#  2: 4# 5x5 cones 3: 3#, 2: 4#, highest shelf 3x5 3: 3#, 2: 4# 5x5 cones 3: 3#, 2: 4#, highest shelf 3x5 3: 3#, 2: 4# 5x5 cones 1: 3#, 2: 4#. 2: 5# highest shelf 6x5 cones 2: 4#, 2: 5#, 1: 7.5# highest                Gait Training 6/13 6/18 6/20 6/25 6/27 7/11                               Modalities 6/13 6/18 6/20 6/25 6/27 7/2 7/9 7/11 7/16 7/18   ice 10' post 10' post Deferred 2/2 time 10' post  10' post  10' post 10' post 10' post 10' post 10' post

## 2024-07-23 ENCOUNTER — OFFICE VISIT (OUTPATIENT)
Dept: PHYSICAL THERAPY | Facility: CLINIC | Age: 64
End: 2024-07-23
Payer: COMMERCIAL

## 2024-07-23 DIAGNOSIS — M75.102 TEAR OF LEFT SUPRASPINATUS TENDON: Primary | ICD-10-CM

## 2024-07-23 PROCEDURE — 97110 THERAPEUTIC EXERCISES: CPT

## 2024-07-23 PROCEDURE — 97112 NEUROMUSCULAR REEDUCATION: CPT

## 2024-07-23 NOTE — PROGRESS NOTES
Daily Note     Today's date: 2024  Patient name: Ralph Leyva  : 1960  MRN: [id number]  Referring provider: Sergio Reid*  Dx:   Encounter Diagnosis     ICD-10-CM    1. Tear of left supraspinatus tendon  M75.102           Start Time: 1430  Stop Time: 1500  Total time in clinic (min): 30 minutes    Subjective: Pt stated he is doing well, no new concerns since last session.       Objective: See treatment diary below      Assessment: Pt tolerated session well. Started session on TM and UBE for warm up. Session focused on scapular/shoulder strength and functional activities to address deficits.  Pt benefit from cues for slowed and controlled tempo during TE's- tendency to use compensatory strategies over form at times- decreased weight during abduction to improve form today. Pt is appropriately challenged with program and is appropriately fatigued post treatment.Pt is challenged with Abd and ER during TE's with limited range noted.  Patient edward benefit from ongoing PT to improve L shoulder strength, mobility, flexibility, scapular stability, and functional ability.       Plan: Continue per plan of care.  Progress treatment as tolerated.       Precautions: s/p L shoulder arthroscopic RTC repair with biceps tenodesis on 24  As per MD (24): NWB LUE in sling x 6 weeks (remove sling on 24).  Can remove Pillow at any time as it's for comfort only.  For now, may start elbow/wrist/hand range of motion. Pendulums to tolerance   Protocol in office visit with MD on 24  NO STRENGTHENING UNTIL WEEK 12  Isometrics ok, no more than coffee cup weight (2#) until 12 weeks.    Date    Visit # 33 34 35 36 37 [phone] 41   FOTO            Re-eval              Manuals    L shoulder PROM                                                Neuro Re-Ed    Row/ Ext  12# 2x15  12# 2x15 12# 2x15 2x15 13#  3x15 18#  3x15 24#  2x15 24#  2x15 25# 2x15 25#   No monies 20x GTB 20x GTB 20x GTB 20x GTB 20x GTB 20x GTB 20x BTB 30x BTB  0x BTB                                        Pt Edu            Ther Ex 6/20 6/25 6/27 7/2 7/9 7/11 7/16 7/18 7/23   ER walkout 10x3 steps  5# 10x3 steps  5# 10x3 steps  5#         IR walkout 10x3 steps  5# 10x3 steps  5# 10x3 steps  5#         ER/IR  7#/4# 2x15 7#/4# 2x15 7#/4# 2x15 7#/7# 2x15  2x15 6#/7# 2x15 6#/7#  2x15 7#/8# 2x15 7#/8#   SL ER 3# 2x10 ea  3# 2x10 ea  3# 2x10 ea  2x10 3# 2x10 6# 2x10 6# 2x10 6#  2x10 6# 2x10 6#   Prone rows 2x10 6# L 2x10 6# L 2x10 6# L 2x10 6# L  2x10 6# L 2x10 6# L 2x10 6# L 2x10 6# 2x10 6#   Prone T& I 2x10 active 2x10 active 2x10 active 2x10 3# T, Y, I 2x10 6# T, Y, I 2x10 6# T, Y, I 2x10 6# T, Y, I 2x10 6# T,I,Y 2x10 6# T,I,Y   Bicep curls 2x10 6# ea 3x10 6# ea 3x10 6# ea 3x10 6# ea 2x15 8# ea 2x15 8# ea 2x15 10# ea 2x15 10# ea 2x15 10# ea   Table slides            Shoulder raises 2x10 scap to 120 deg, 2x10 abd 90+ deg 2# 2x10 scap to 120 deg, 2x10 abd 90+ deg 2# 2x10 scap to 120 deg, 2x10 abd 90+ deg 3# 2x10 3# flex, abd 2x15 3# flex, abd 2x15 3# flex, abd 2x10 6# flex, abd 2x15 6# flex, abd* 2x15 6# flex, 3#abd   Offering raises    2x10 OTB 3x10 PTB  3x10 PTB  3x10 GTB     Deltoid isometrics            Wall slides            Finger ladder            AAROM w cane            pulleys            UBE 3'/3' 3'/3' 3'/3' 3'/3' 3'/3' 3'/3' 3'/3' 3'/3' 3'/3'   Tmill warmup/cooldown      nv 10' post 10' pre 10' pre   Ther Activity 6/20 6/25 6/27 7/2 7/9 7/11 7/16 7/18 7/23   Cones 4x5 cones flex, abd 0# 2-2#, 2-1# 4x5 2-2#, 2-1#, 1-2.5# 4x5 4x5 cones: 3: 3#  2: 4# 5x5 cones 3: 3#, 2: 4#, highest shelf 3x5 3: 3#, 2: 4# 5x5 cones 3: 3#, 2: 4#, highest shelf 3x5 3: 3#, 2: 4# 5x5 cones 1: 3#, 2: 4#. 2: 5# highest shelf 6x5 cones 2: 4#, 2: 5#, 1: 7.5# highest 6x5 cones 2: 4#, 2: 5#, 1: 7.5# highest               Gait Training 6/20 6/25  6/27 7/11                              Modalities 6/20 6/25 6/27 7/2 7/9 7/11 7/16 7/18 7/23   ice Deferred 2/2 time 10' post  10' post  10' post 10' post 10' post 10' post 10' post 10'  post

## 2024-07-25 ENCOUNTER — OFFICE VISIT (OUTPATIENT)
Dept: PHYSICAL THERAPY | Facility: CLINIC | Age: 64
End: 2024-07-25
Payer: COMMERCIAL

## 2024-07-25 DIAGNOSIS — M75.102 TEAR OF LEFT SUPRASPINATUS TENDON: Primary | ICD-10-CM

## 2024-07-25 PROCEDURE — 97140 MANUAL THERAPY 1/> REGIONS: CPT

## 2024-07-25 PROCEDURE — 97112 NEUROMUSCULAR REEDUCATION: CPT

## 2024-07-25 NOTE — PROGRESS NOTES
"Daily Note     Today's date: 2024  Patient name: Ralph Leyva  : 1960  MRN: [id number]  Referring provider: Sergio Reid*  Dx:   Encounter Diagnosis     ICD-10-CM    1. Tear of left supraspinatus tendon  M75.102                        Subjective: \" The shoulder feels a lit better when I do exercises slow and controlled.\"      Objective: See treatment diary below      Assessment: Ralph presents to therapy s/p RTC repair. Cuing for scap activation with AROM throughout.  Fatigue with prolonged repetitions and noted compensation with fast movement. Heavy cuing for slowed performance and scap activation. Tolerated session without adverse effects. Recommend continued skilled therapy to improve overall strength and mobility for functional return with decreased compensation and pain.        Plan: Continue per plan of care.  Progress treatment as tolerated.       Precautions: s/p L shoulder arthroscopic RTC repair with biceps tenodesis on 24  As per MD (24): NWB LUE in sling x 6 weeks (remove sling on 24).  Can remove Pillow at any time as it's for comfort only.  For now, may start elbow/wrist/hand range of motion. Pendulums to tolerance   Protocol in office visit with MD on 24  NO STRENGTHENING UNTIL WEEK 12  Isometrics ok, no more than coffee cup weight (2#) until 12 weeks.    Date    Visit # 42 34 35 36 37 38 [phone] 40 41   FOTO            Re-eval              Manuals    L shoulder PROM                                                Neuro Re-Ed    Row/ Ext  2x15 25# 12# 2x15 12# 2x15 2x15 13#  3x15 18#  3x15 24#  2x15 24#  2x15 25# 2x15 25#   No monies 30x BTB  20x GTB 20x GTB 20x GTB 20x GTB 20x GTB 20x BTB 30x BTB  0x BTB                                        Pt Edu            Ther Ex 7/25 6/25 6   ER walkout  10x3 " steps  5# 10x3 steps  5#         IR walkout  10x3 steps  5# 10x3 steps  5#         ER/IR   7#/4# 2x15 7#/4# 2x15 7#/7# 2x15  2x15 6#/7# 2x15 6#/7#  2x15 7#/8# 2x15 7#/8#   SL ER 3x10 6# 3# 2x10 ea  3# 2x10 ea  2x10 3# 2x10 6# 2x10 6# 2x10 6#  2x10 6# 2x10 6#   Prone rows 2x10 7# 2x10 6# L 2x10 6# L 2x10 6# L  2x10 6# L 2x10 6# L 2x10 6# L 2x10 6# 2x10 6#   Prone T& I 2x10 6# T,I,Y 2x10 active 2x10 active 2x10 3# T, Y, I 2x10 6# T, Y, I 2x10 6# T, Y, I 2x10 6# T, Y, I 2x10 6# T,I,Y 2x10 6# T,I,Y   Bicep curls 2x15 10# ea 3x10 6# ea 3x10 6# ea 3x10 6# ea 2x15 8# ea 2x15 8# ea 2x15 10# ea 2x15 10# ea 2x15 10# ea   Table slides            Shoulder raises 2x15 6# flex, 3#abd 2x10 scap to 120 deg, 2x10 abd 90+ deg 2# 2x10 scap to 120 deg, 2x10 abd 90+ deg 3# 2x10 3# flex, abd 2x15 3# flex, abd 2x15 3# flex, abd 2x10 6# flex, abd 2x15 6# flex, abd* 2x15 6# flex, 3#abd   Offering raises    2x10 OTB 3x10 PTB  3x10 PTB  3x10 GTB     Deltoid isometrics            Wall slides            Finger ladder            AAROM w cane            pulleys            UBE 3'/3' 3'/3' 3'/3' 3'/3' 3'/3' 3'/3' 3'/3' 3'/3' 3'/3'   Tmill warmup/cooldown 10' pre      nv 10' post 10' pre 10' pre   Ther Activity 7/25 6/25 6/27 7/2 7/9 7/11 7/16 7/18 7/23   Cones 6x5 cones 2: 4#, 2: 5#, 1: 7.5# highest 2-2#, 2-1# 4x5 2-2#, 2-1#, 1-2.5# 4x5 4x5 cones: 3: 3#  2: 4# 5x5 cones 3: 3#, 2: 4#, highest shelf 3x5 3: 3#, 2: 4# 5x5 cones 3: 3#, 2: 4#, highest shelf 3x5 3: 3#, 2: 4# 5x5 cones 1: 3#, 2: 4#. 2: 5# highest shelf 6x5 cones 2: 4#, 2: 5#, 1: 7.5# highest 6x5 cones 2: 4#, 2: 5#, 1: 7.5# highest               Gait Training  6/25 6/27 7/11                              Modalities  6/25 6/27 7/2 7/9 7/11 7/16 7/18 7/23   ice 10' post  10' post  10' post  10' post 10' post 10' post 10' post 10' post 10'  post

## 2024-07-26 ENCOUNTER — TELEPHONE (OUTPATIENT)
Age: 64
End: 2024-07-26

## 2024-07-30 ENCOUNTER — EVALUATION (OUTPATIENT)
Dept: PHYSICAL THERAPY | Facility: CLINIC | Age: 64
End: 2024-07-30
Payer: COMMERCIAL

## 2024-07-30 DIAGNOSIS — M75.102 TEAR OF LEFT SUPRASPINATUS TENDON: Primary | ICD-10-CM

## 2024-07-30 PROCEDURE — 97164 PT RE-EVAL EST PLAN CARE: CPT

## 2024-07-30 PROCEDURE — 97110 THERAPEUTIC EXERCISES: CPT

## 2024-07-30 NOTE — PROGRESS NOTES
Re-evaluation     Today's date: 2024  Patient name: Ralph Leyva  : 1960  MRN: [id number]  Referring provider: Sergio Reid*  Dx:   Encounter Diagnosis     ICD-10-CM    1. Tear of left supraspinatus tendon  M75.102           Start Time: 1400  Stop Time: 1450  Total time in clinic (min): 50 minutes    Subjective: Pt stated that he is doing well and that he feels that both sides are feeling just about equal, except for he can tell that his L shoulder abduction and flexion feel a little more weak and sore compared to RUE. Pt stated that he fell on  and he banged his L elbow and R hip but his shoulder is doing fine.      Objective: See treatment diary below    R shoulder strength:  Flexion -  4 pain  Abduction- 4+  ER- 4  IR - 3+    L shoulder strength:  Flexion -  4- pain  Abduction- 4+  ER- 4  IR - 4    L shoulder AROM:  Flexion -  172 degrees   Abduction- 174 degrees   ER-  80 degrees   IR -  80 degrees     R shoulder AROM:  Flexion -  180 degrees   Abduction- 180 degrees   ER-  80 degrees   IR -  80 degrees       Assessment: Pt presents to the clinic for re evaluation. Pt demonstrated good improvement in L shoulder strength, but continues to have some pain and weakness with L shoulder flexion and abduction compared to RUE but is just about WNL compared to RUE. Pt demonstrated good improvement in L shoulder AROM as well with only slightly decreased flexion and abduction AROM compared to RUE. Overall, pt has shown very good improvement in LUE function since last RE and discussed with pt that he can transition to HEP whenever he feels that he is ready based on weights used for exercises in clinic and good understanding of all activities, pt stated that he would be willing to have next visit be his last visit but would like an updated written HEP for gym and home use. Pt was provided with increased resistance band today as well as updated written HEP. Potential discharge nv.        Plan: Continue per plan of care.  Progress treatment as tolerated.       Precautions: s/p L shoulder arthroscopic RTC repair with biceps tenodesis on 2/23/24  As per MD (2/26/24): NWB LUE in sling x 6 weeks (remove sling on 4/5/24).  Can remove Pillow at any time as it's for comfort only.  For now, may start elbow/wrist/hand range of motion. Pendulums to tolerance   Protocol in office visit with MD on 2/26/24  NO STRENGTHENING UNTIL WEEK 12  Isometrics ok, no more than coffee cup weight (2#) until 12 weeks.    Date 7/25 7/30 7/11 7/16 7/18 7/23   Visit # 42 43    38 39 40 41   FOTO            Re-eval  DK            Manuals 7/25 7/30 7/11 7/16 7/18 7/23   L shoulder PROM                                                Neuro Re-Ed 7/25 7/30 7/11 7/16 7/18 7/23   Row/ Ext  2x15 25# 2x15 25#    3x15 24#  2x15 24#  2x15 25# 2x15 25#   No monies 30x BTB  30x BTB    20x GTB 20x BTB 30x BTB  0x BTB                                        Pt Edu  DK HEP          Ther Ex 7/25 7/30 7/11 7/16 7/18 7/23   ER walkout            IR walkout            ER/IR       2x15 6#/7#  2x15 7#/8# 2x15 7#/8#   SL ER 3x10 6#     2x10 6# 2x10 6#  2x10 6# 2x10 6#   Prone rows 2x10 7# 2x10 6#     2x10 6# L 2x10 6# L 2x10 6# 2x10 6#   Prone T& I 2x10 6# T,I,Y 2x10 6# T,I,Y    2x10 6# T, Y, I 2x10 6# T, Y, I 2x10 6# T,I,Y 2x10 6# T,I,Y   Bicep curls 2x15 10# ea     2x15 8# ea 2x15 10# ea 2x15 10# ea 2x15 10# ea   Table slides            Shoulder raises 2x15 6# flex, 3#abd 2x15 6# flex,     2x15 3# flex, abd 2x10 6# flex, abd 2x15 6# flex, abd* 2x15 6# flex, 3#abd   Offering raises      3x10 PTB  3x10 GTB     Deltoid isometrics            Wall slides            Finger ladder            AAROM w cane            pulleys            UBE 3'/3'     3'/3' 3'/3' 3'/3' 3'/3'   Tmill warmup/cooldown 10' pre  10' pre    nv 10' post 10' pre 10' pre   Ther Activity 7/25 7/30 7/11 7/16 7/18 7/23   Cones 6x5 cones 2: 4#, 2: 5#, 1: 7.5# highest 6x5  cones 2: 4#, 2: 5#, 1: 7.5# highest    5x5 cones 3: 3#, 2: 4#, highest shelf 3x5 3: 3#, 2: 4# 5x5 cones 1: 3#, 2: 4#. 2: 5# highest shelf 6x5 cones 2: 4#, 2: 5#, 1: 7.5# highest 6x5 cones 2: 4#, 2: 5#, 1: 7.5# highest               Gait Training      7/11                              Modalities      7/11 7/16 7/18 7/23   ice 10' post      10' post 10' post 10' post 10'  post

## 2024-08-01 ENCOUNTER — OFFICE VISIT (OUTPATIENT)
Dept: PHYSICAL THERAPY | Facility: CLINIC | Age: 64
End: 2024-08-01
Payer: COMMERCIAL

## 2024-08-01 DIAGNOSIS — M75.102 TEAR OF LEFT SUPRASPINATUS TENDON: Primary | ICD-10-CM

## 2024-08-01 PROCEDURE — 97112 NEUROMUSCULAR REEDUCATION: CPT

## 2024-08-01 PROCEDURE — 97110 THERAPEUTIC EXERCISES: CPT

## 2024-08-01 NOTE — PROGRESS NOTES
Discharge Note     Today's date: 2024  Patient name: Ralph Leyva  : 1960  MRN: [id number]  Referring provider: Sergio Reid*  Dx:   Encounter Diagnosis     ICD-10-CM    1. Tear of left supraspinatus tendon  M75.102           Start Time: 1400  Stop Time: 1445  Total time in clinic (min): 45 minutes    Subjective: Pt reports he is feeling good today, no new c/o pain or symptoms. Pt states today is his last session.       Objective: See treatment diary below      Assessment: Tolerated treatment well. Patient demonstrated fatigue post treatment with good form and technique with exercises of HEP. Pt educated on HEP exercises form and frequency. Continue to follow HEP as able.       Plan:  Discharge to [facility].      Precautions: s/p L shoulder arthroscopic RTC repair with biceps tenodesis on 24  As per MD (24): NWB LUE in sling x 6 weeks (remove sling on 24).  Can remove Pillow at any time as it's for comfort only.  For now, may start elbow/wrist/hand range of motion. Pendulums to tolerance   Protocol in office visit with MD on 24  NO STRENGTHENING UNTIL WEEK 12  Isometrics ok, no more than coffee cup weight (2#) until 12 weeks.    Date    Visit # 42 43 44   38 39 40 41   FOTO            Re-eval  DK            Manuals    L shoulder PROM                                                Neuro Re-Ed    Row/ Ext  2x15 25# 2x15 25# 2x15 25#   3x15 24#  2x15 24#  2x15 25# 2x15 25#   No monies 30x BTB  30x BTB 30x BTB   20x GTB 20x BTB 30x BTB  0x BTB                                        Pt Edu  DK HEP          Ther Ex    ER walkout            IR walkout            ER/IR       2x15 6#/7#  2x15 7#/8# 2x15 7#/8#   SL ER 3x10 6#     2x10 6# 2x10 6#  2x10 6# 2x10 6#   Prone rows 2x10 7# 2x10 6#  2x10 6#    2x10 6# L 2x10 6# L 2x10 6# 2x10 6#   Prone  T& I 2x10 6# T,I,Y 2x10 6# T,I,Y 2x10 6# T,I,Y   2x10 6# T, Y, I 2x10 6# T, Y, I 2x10 6# T,I,Y 2x10 6# T,I,Y   Bicep curls 2x15 10# ea     2x15 8# ea 2x15 10# ea 2x15 10# ea 2x15 10# ea   Table slides            Shoulder raises 2x15 6# flex, 3#abd 2x15 6# flex,  2x15 6# flex,    2x15 3# flex, abd 2x10 6# flex, abd 2x15 6# flex, abd* 2x15 6# flex, 3#abd   Offering raises      3x10 PTB  3x10 GTB     Deltoid isometrics            Wall slides            Finger ladder            AAROM w cane            pulleys            UBE 3'/3'  3'/3'   3'/3' 3'/3' 3'/3' 3'/3'   Tmill warmup/cooldown 10' pre  10' pre 10' pre   nv 10' post 10' pre 10' pre   Ther Activity 7/25 7/30 8/1 7/11 7/16 7/18 7/23   Cones 6x5 cones 2: 4#, 2: 5#, 1: 7.5# highest 6x5 cones 2: 4#, 2: 5#, 1: 7.5# highest np   5x5 cones 3: 3#, 2: 4#, highest shelf 3x5 3: 3#, 2: 4# 5x5 cones 1: 3#, 2: 4#. 2: 5# highest shelf 6x5 cones 2: 4#, 2: 5#, 1: 7.5# highest 6x5 cones 2: 4#, 2: 5#, 1: 7.5# highest               Gait Training   8/1 7/11                              Modalities   8/1 7/11 7/16 7/18 7/23   ice 10' post      10' post 10' post 10' post 10'  post

## 2024-08-02 ENCOUNTER — TELEPHONE (OUTPATIENT)
Dept: PSYCHIATRY | Facility: CLINIC | Age: 64
End: 2024-08-02

## 2024-08-02 NOTE — TELEPHONE ENCOUNTER
One week follow up call for New Patient appointment with Lora Hernández [[id number]] on 10/23  was made on 8/2. Writer informed patient of New Patient paperwork needing to be completed 5 days prior to the appointment. Writer confirmed paperwork has been sent via My Chart.

## 2024-08-02 NOTE — TELEPHONE ENCOUNTER
"Behavioral Health Outpatient Intake Questions    Referred By   :     Please advise interviewee that they need to answer all questions truthfully to allow for best care, and any misrepresentations of information may affect their ability to be seen at this clinic   => Was this discussed? Yes     If Minor Child (under age 18)    Who is/are the legal guardian(s) of the child?     Is there a custody agreement? No     If \"YES\"- Custody orders must be obtained prior to scheduling the first appointment  In addition, Consent to Treatment must be signed by all legal guardians prior to scheduling the first appointment    If \"NO\"- Consent to Treatment must be signed by all legal guardians prior to scheduling the first appointment    Behavioral Health Outpatient Intake History -     Presenting Problem (in patient's own words): needs meds, PCP wants him to see psych    Are there any communication barriers for this patient?     No                                               If yes, please describe barriers:     If there is a unique situation, please refer to Geovanny Rivera/Mari Montanez for final determination.    Are you taking any psychiatric medications? Yes  Latuda    If \"YES\" -What are they l      If \"YES\" -Who prescribes?     Has the Patient previously received outpatient Talk Therapy or Medication Management from [facility]  Yes        If \"YES\"- When, Where and with Whom? A long time ago        If \"NO\" -Has Patient received these services elsewhere?       If \"YES\" -When, Where, and with Whom?[facility] med mgmt    Has the Patient abused alcohol or other substances in the last 6 months ? No  No concerns of substance abuse are reported.     If \"YES\" -What substance, How much, How often?      If illegal substance: Refer to [facility] (for TY) or [facility].   If Alcohol in excess of 10 drinks per week:  Refer to [facility] (for TY) or [facility]    Legal History-     Is this treatment court ordered? No   If " "\"yes \"send to :  Talk Therapy : Send to Geovanny Rivera/Mari Montanez for final determination   Med Management: Send to Dr Tolliver for final determination     Has the Patient been convicted of a felony?  yes   If \"Yes\" send to -When, What?1988 possession  Talk Therapy: Send to Geovanny Rivera/Mari Montanez for final determination   Med Management: Send to Dr Tolliver for final determination     ACCEPTED as a patient Yes  If \"Yes\" Appointment Date: 10/23    Referred Elsewhere? No  If “Yes” - (Where? Ex: [facility], [facility], [facility], [facility], etc.)        Name of Insurance Co:[facility]  Insurance ID#   Insurance Phone #   If ins is primary or secondary?   If patient is a minor, parents information such as Name, D.O.B of guarantor.  "

## 2024-10-23 ENCOUNTER — OFFICE VISIT (OUTPATIENT)
Dept: PSYCHIATRY | Facility: CLINIC | Age: 64
End: 2024-10-23
Payer: COMMERCIAL

## 2024-10-23 VITALS — BODY MASS INDEX: 38.51 KG/M2 | HEIGHT: 69 IN | WEIGHT: 260 LBS

## 2024-10-23 DIAGNOSIS — F10.21 ALCOHOL DEPENDENCE IN REMISSION (HCC): ICD-10-CM

## 2024-10-23 DIAGNOSIS — F31.75 BIPOLAR 1 DISORDER, DEPRESSED, PARTIAL REMISSION (HCC): Primary | ICD-10-CM

## 2024-10-23 PROBLEM — E66.813 OBESITY, CLASS III, BMI 40-49.9 (MORBID OBESITY): Status: ACTIVE | Noted: 2017-01-29

## 2024-10-23 PROBLEM — E66.01 OBESITY, CLASS III, BMI 40-49.9 (MORBID OBESITY) (HCC): Status: ACTIVE | Noted: 2017-01-29

## 2024-10-23 PROBLEM — H40.1131 PRIMARY OPEN ANGLE GLAUCOMA (POAG) OF BOTH EYES, MILD STAGE: Status: ACTIVE | Noted: 2020-08-08

## 2024-10-23 PROBLEM — E11.40 CHRONIC PAINFUL DIABETIC NEUROPATHY (HCC): Status: ACTIVE | Noted: 2019-04-09

## 2024-10-23 PROBLEM — E11.40 CONTROLLED TYPE 2 DIABETES WITH NEUROPATHY (HCC): Status: ACTIVE | Noted: 2017-08-21

## 2024-10-23 PROCEDURE — 90792 PSYCH DIAG EVAL W/MED SRVCS: CPT | Performed by: PHYSICIAN ASSISTANT

## 2024-10-23 RX ORDER — LURASIDONE HYDROCHLORIDE 20 MG/1
20 TABLET, FILM COATED ORAL
Qty: 90 TABLET | Refills: 1 | Status: SHIPPED | OUTPATIENT
Start: 2024-10-23

## 2024-10-23 RX ORDER — LURASIDONE HYDROCHLORIDE 20 MG/1
20 TABLET, FILM COATED ORAL EVERY MORNING
COMMUNITY
Start: 2024-10-14 | End: 2024-10-23 | Stop reason: SDUPTHER

## 2024-10-23 NOTE — PSYCH
PSYCHIATRIC EVALUATION     [facility] - PSYCHIATRIC ASSOCIATES    Name and Date of Birth:  Ralph Leyva 64 y.o. 1960    Date of Visit: 10/23/24    Assessment & Plan  Bipolar 1 disorder, depressed, partial remission (HCC)  Continue Latuda 20 mg daily with food  Stable with low dosage and with monotherapy and will continue to monitor  Orders:    lurasidone (LATUDA) 20 mg tablet; Take 1 tablet (20 mg total) by mouth daily with dinner    Alcohol dependence in remission (HCC)  Has been sober for 25 years  Continues to attend support group meetings     He verbalized understanding and agreement with treatment plan  Follow-up in 3 months and call sooner if any questions or concerns  Aware of after-hours on-call service and [phone] crisis line    Visit Time start:1130  End 1230  60 minutes    Reason for visit:   Chief Complaint   Patient presents with    Establish Care     HPI     Ralph is a 64 y.o. male with a history of Bipolar Disorder who presents for psychiatric evaluation due to  need to establish care .  As noted and reports of a long history of substance abuse primarily alcohol, cocaine and pain medication.  States that he has been sober for 25 years now.  His last inpatient rehabilitation stay was 2000 at [facility].  He has been sober since then.  He started seeing a psychiatrist at that time.  States he was diagnosed with bipolar disorder and was on Depakote and Topamax.  He currently has been stable on Latuda and feels well with this medication.  States that he had manic episodes where he was awake for 1 to 2 days and did have episodes of increased spending and then episodes of depression.  He denies any suicidal behaviors and no history of suicide attempts.  He has no history of inpatient behavioral health stays.    He was born in [place] and came to the  at age 5 with both of his parents.  His parents are now .  He has 2 brothers 1 who is .  Has a  good supportive ship with his other brother.  He has 4 children from 3 different women and states he has a good relationship with his children.  They all have suffered from substance abuse as well.  He continues to go to meetings on a regular basis and has also done a lot of volunteer work within the rehab community.  He currently is employed as a castro and owns a castro shop.    States he has good interest and motivation.  Does report some lack of energy at times.  No current manic signs or symptoms.  No significant depressive signs or symptoms.  No suicidal or homicidal ideation and no history of self-harm.  No obsessive-compulsive behaviors noted.  No history of eating disorders.  No psychotic signs or symptoms, no hallucinations and no delusions.  He was incarcerated from [phone] for drug use and distribution.  No other incarcerations and currently no legal issues.  States that he is sleeping well at night and utilizes his CPAP machine.  Reports that his appetite is adequate.    HPI ROS Appetite Changes and Sleep: Sleep normal with CPAP, adequate appetite, slightly decreased energy    Psychiatric Review Of Systems:    Sleep changes: no  Appetite changes: no  Weight changes: no  Energy/anergy: no, slightly decreased energy  Interest/pleasure/anhedonia: no  Somatic symptoms: no  Anxiety/panic: no  Ayanna: not currently, history of ayanna  Guilty/hopeless: no  Self injurious behavior/risky behavior: no  Suicidal ideation: no  Homicidal ideation: no  Auditory hallucinations: no  Visual hallucinations: no  Other hallucinations: no  Delusional thinking: no  Eating disorder history: no  Obsessive/compulsive symptoms: no    Review Of Systems:    Mood Normal   Behavior Normal    Thought Content Normal   General Normal    Personality No change   Other Psych Symptoms As noted in HPI   Constitutional negative   ENT negative   Cardiovascular negative   Respiratory negative   Gastrointestinal negative   Genitourinary  negative   Musculoskeletal negative   Integumentary negative   Neurological negative   Endocrine negative   Other Symptoms none       Past Psychiatric History:     Past Inpatient Psychiatric Treatment:   No history of past inpatient psychiatric admissions  Past Outpatient Psychiatric Treatment:    Started in outpatient psychiatry in the year 2000 with Dr. Cheatham, subsequently seen by Dr. Villafuerte and then psychiatrist at [facility] , was last seen by a psychiatrist in 2020 and then psychiatrist left the practice and unable to be scheduled for follow-up until now, primary care physician has been filling his prescription for Latuda  Past Suicide Attempts: no  Past Violent Behavior: no  Past Psychiatric Medication Trials:  Topamax, Depakote, Latuda, Cymbalta    Family Psychiatric History:   Family history of substance abuse, father  Mother had depression  4 sons with history of substance use  No family history of suicide  History reviewed. No pertinent family history.    Social History     Substance and Sexual Activity   Drug Use No     Social History     Socioeconomic History    Marital status: Single     Spouse name: Not on file    Number of children: Not on file    Years of education: Not on file    Highest education level: Not on file   Occupational History    Not on file   Tobacco Use    Smoking status: Former    Smokeless tobacco: Never   Vaping Use    Vaping status: Never Used   Substance and Sexual Activity    Alcohol use: No    Drug use: No    Sexual activity: Not on file   Other Topics Concern    Not on file   Social History Narrative    Not on file     Social Determinants of Health     Financial Resource Strain: Low Risk  (5/12/2024)    [facility]    Overall Financial Resource Strain (CARDIA)     Difficulty of Paying Living Expenses: Not very hard   Food Insecurity: No Food Insecurity (5/12/2024)    Received from [facility],  [facility]    Hunger Vital Sign     Worried About Running Out of Food in the Last Year: Never true     Ran Out of Food in the Last Year: Never true   Transportation Needs: No Transportation Needs (2024)    [facility] from [facility], [facility]    PRAPARE - Transportation     Lack of Transportation (Medical): No     Lack of Transportation (Non-Medical): No   Physical Activity: Not on file   Stress: Stress Concern Present (2024)    Received from [facility], [facility]    [employer] [place] - Occupational Stress Questionnaire     Feeling of Stress : To some extent   Social Connections: Feeling Socially Integrated (2024)    Received from [facility], [facility]    OASIS : Social Isolation     How often do you feel lonely or isolated from those around you?: Rarely   Intimate Partner Violence: Not At Risk (2024)    Received from [facility], [facility]    Humiliation, Afraid, Rape, and Kick questionnaire     Fear of Current or Ex-Partner: No     Emotionally Abused: No     Physically Abused: No     Sexually Abused: No   Housing Stability: Low Risk  (2024)    Received from [facility], [facility]    Housing Stability Vital Sign     Unable to Pay for Housing in the Last Year: No     Number of Times Moved in the Last Year: 0     Homeless in the Last Year: No     Social History     Social History Narrative    Not on file   Social history:  Born in [place], [place] age 5 with his parents  Parents are   Has 2 brothers, 1 brother , other brother is supportive,  vet  1 nickolas in [place]  Has 4 sons ages range from 40-48, good support relationship with his children  Received his GED while incarcerated  Incarcerated [facility] for drugs 19  [phone] [phone]  Never , no current significant romantic relationship  No current substance use, no nicotine use  Caffeine use  No access to firearms  Traumatic History:     Abuse:  None reported  Other Traumatic Events:  None reported    History Review:    normal bulk    OBJECTIVE:     Mental Status Evaluation:    Appearance age appropriate, casually dressed, dressed appropriately   Behavior pleasant, cooperative, calm   Speech normal rate, normal volume, normal pitch   Mood euthymic   Affect normal range and intensity   Thought Processes organized, logical, coherent, goal directed   Associations intact associations   Thought Content no overt delusions   Perceptual Disturbances: no auditory hallucinations, no visual hallucinations   Abnormal Thoughts  Risk Potential Suicidal ideation - None  Homicidal ideation - None  Potential for aggression - No   Orientation oriented to person, place, time/date, and situation   Memory recent and remote memory grossly intact   Cosciousness alert and awake   Attention Span attention span and concentration are age appropriate   Intellect Not Formally Assessed   Insight good   Judgement good   Muscle Strength and  Gait normal muscle strength and normal muscle tone, normal gait and normal balance   Language no difficulty naming common objects   Fund of Knowledge displays adequate knowledge of current events   Pain none   Pain Scale pain       Laboratory Results: No results found for this or any previous visit.    Assessment/Plan:      Diagnoses and all orders for this visit:    Bipolar 1 disorder, depressed, partial remission (HCC)  -     lurasidone (LATUDA) 20 mg tablet; Take 1 tablet (20 mg total) by mouth daily with dinner    Alcohol dependence in remission (HCC)    Other orders  -     Discontinue: lurasidone (LATUDA) 20 mg tablet; Take 20 mg by mouth every morning          Treatment Recommendations/Precautions:    Continue Latuda 20 mg daily  Takes with food  On low dosage and  appears to be at his current baseline    Risks/Benefits    Discussed potential for adverse effects with Latuda including metabolic syndrome and movement disorder, tardive dyskinesia, parkinsonism  Risks, Benefits And Possible Side Effects Of Medications:    Risks, benefits, and possible side effects of medications explained to patient and patient verbalizes understanding and agreement for treatment.    Controlled Medication Discussion:     Not applicable  Lora Hernández PA-C

## 2024-10-23 NOTE — PSYCH
"MEDICATION MANAGEMENT NOTE  Family recovery communcation  Mobility- joing gym      [facility]      Name and Date of Birth:  Ralph Leyva 64 y.o. 1960 MRN: [id number]    Insurance: Payor: [facility] DUAL COMPLETE MC REP / Plan: [facility] DUAL COMPLETE  REP / Product Type: Medicare HMO /     Date of Visit: October 23, 2024    Reason for Visit: No chief complaint on file.      Assessment & Plan  Bipolar 1 disorder, depressed, partial remission (HCC)    Orders:    lurasidone (LATUDA) 20 mg tablet; Take 1 tablet (20 mg total) by mouth daily with dinner    Alcohol dependence in remission (HCC)                   Risks/benefits/alternativies to treatment discussed, including potential adverse medication side effects, to which Ralph voiced understanding and consented fully to treatment.  Also, patient is amenable to calling/contacting the outpatient office including this writer if any acute adverse effects of their medication regimen arise in addition to any comments or concerns pertaining to their psychiatric management.      Medications Risks/Benefits      Risks, Benefits And Possible Side Effects Of Medications:    {EFO AMB RISKS/BENEFITS MEDICATIONS:85313}    Controlled Medication Discussion:     {EFO AMB PSYCH CONTROLLED MED DISCUSSION:13980}         Subjective      Ralph Leyva is a 64 y.o. male, visited for No chief complaint on file., [facility] and evaluated today at the [facility] outpatient clinic for follow-up and medication management. Completes psychiatric assessment without difficulty.     At previous outpatient psychiatric appointment with this writer, ***.   He denies any current adverse medication side effects.      Overall, Ralph        ***      Review Of Systems:  {Review of System:09171::\"Pertinent items are noted in HPI; all others are negative; no recent changes in medications or health status " "reported.\"}    PHQ-2/9 Depression Screening                 Historical Information    Past Psychiatric History Update:   {al gen PPH:64238::\"- No inpatient psychiatric admission since last encounter\",\"- No SA or SIB since last encounter\",\"- No incidence of violent behavior since last encounter\"}    Past Trauma History Update: ***  - No new onset of abuse or traumatic events since last encounter     Past Medical History:    Past Medical History:   Diagnosis Date    Asthma     CPAP (continuous positive airway pressure) dependence     Diabetes mellitus (HCC)     Hyperlipidemia     Hypertension     Irregular heart beat     Seizures (HCC)     last 2002    Sleep apnea     Stroke (HCC)     mini        Past Surgical History:   Procedure Laterality Date    BACK SURGERY      COLONOSCOPY      HERNIA REPAIR      KS SURGICAL ARTHROSCOPY SHOULDER W/ROTATOR CUFF RPR Left 01/17/2017    Procedure: SHOULDER ARTHROSCOPY ROTATOR CUFF REPAIR , SAD;  Surgeon: Sergio Reid MD;  Location:  MAIN OR;  Service: Orthopedics    KS SURGICAL ARTHROSCOPY SHOULDER W/ROTATOR CUFF RPR Left 2/23/2024    Procedure: REVISION SHOULDER ARTHROSCOPIC ROTATOR CUFF REPAIR, BICEPS TENODESIS;  Surgeon: Sergio Reid MD;  Location: [facility] MAIN OR;  Service: Orthopedics    ROTATOR CUFF REPAIR Right      Allergies   Allergen Reactions    Dipyridamole Other (See Comments)     Pt not sure if allergic/reaction- request to keep on chart    Other      Narcotics  Is in recovery    Lactose - Food Allergy GI Intolerance    Morphine And Codeine Other (See Comments)     In recovery    Perphenazine Rash       Substance Abuse History:    Social History     Substance and Sexual Activity   Alcohol Use No     Social History     Substance and Sexual Activity   Drug Use No       Social History:    Social History     Socioeconomic History    Marital status: Single     Spouse name: Not on file    Number of children: Not on file    Years of education: Not on file    " Highest education level: Not on file   Occupational History    Not on file   Tobacco Use    Smoking status: Former    Smokeless tobacco: Never   Vaping Use    Vaping status: Never Used   Substance and Sexual Activity    Alcohol use: No    Drug use: No    Sexual activity: Not on file   Other Topics Concern    Not on file   Social History Narrative    Not on file     Social Determinants of Health     Financial Resource Strain: Low Risk  (5/12/2024)    [facility], [facility]    Overall Financial Resource Strain (CARDIA)     Difficulty of Paying Living Expenses: Not very hard   Food Insecurity: No Food Insecurity (5/12/2024)    Received from [facility], [facility]    Hunger Vital Sign     Worried About Running Out of Food in the Last Year: Never true     Ran Out of Food in the Last Year: Never true   Transportation Needs: No Transportation Needs (5/12/2024)    Received from [facility], [facility]    PRAPARE - Transportation     Lack of Transportation (Medical): No     Lack of Transportation (Non-Medical): No   Physical Activity: Not on file   Stress: Stress Concern Present (5/12/2024)    Received from [facility], [facility]    [employer] [place] [employer] - Occupational Stress Questionnaire     Feeling of Stress : To some extent   Social Connections: Feeling Socially Integrated (5/12/2024)    Received from [facility], [facility]    OASIS : Social Isolation     How often do you feel lonely or isolated from those around you?: Rarely   Intimate Partner Violence: Not At Risk (5/12/2024)    Received from [facility], [facility]    Humiliation, Afraid, Rape, and Kick questionnaire     Fear of Current or Ex-Partner: No     Emotionally Abused: No     Physically Abused: No     Sexually  "Abused: No   Housing Stability: Low Risk  (5/12/2024)    [facility]    Housing Stability Vital Sign     Unable to Pay for Housing in the Last Year: No     Number of Times Moved in the Last Year: 0     Homeless in the Last Year: No       Family Psychiatric History:     No family history on file.    History Review: The following portions of the patient's history were reviewed and updated as appropriate: allergies, current medications, past family history, past medical history, past social history, past surgical history and problem list.           Objective      Vital signs in last 24 hours:  Vitals:    10/23/24 1220   Weight: 118 kg (260 lb)   Height: 5' 8.5\" (1.74 m)       Mental Status Evaluation:    Appearance {EFO AMB EXAM; GENERAL PSYCH:95874::\"age appropriate\",\"casually dressed\"}   Behavior {EFO AMB Exam; behavior:34408::\"cooperative\",\"calm\"}   Speech {EFO AMB EXAM; speech:20263::\"normal rate\",\"normal volume\",\"normal pitch\"}   Mood {EFO EXAM; MOOD LESS/MORE:42516}   Affect {EFO AMB AFFECT:35900::\"normal range and intensity\",\"appropriate\"}   Thought Processes {EFO AMB THOUGHT PROCESS:18128::\"organized\",\"goal directed\"}   Associations {EFO AMB THOUGHT ASSOCIATIONS:84780::\"intact associations\"}   Thought Content {EFO AMB Exam; thought content:81620::\"no overt delusions\"}   Perceptual Disturbances: {EFO AMB Perceptual Disturbances:42892::\"no auditory hallucinations\",\"no visual hallucinations\"}   Abnormal Thoughts  Risk Potential Suicidal ideation - {EFO Amb Suicidal Thoughts:33997::\"None\"}  Homicidal ideation - {EFO Amb HI:21426::\"None\"}  Potential for aggression - {EFO Potential for aggression:64469::\"No\"}   Orientation {EFO AMB ORIENTED/DISORIENTED:93735::\"oriented to: \"}{EFO AMB ORIENTATION:52988::\"person\",\"place\",\"time/date\",\"situation\"}   Memory {EFO AMB EXAM; PSYCH COGNITION:88458::\"recent and remote memory grossly intact\"}   Consciousness {EFO AMB " "Consciousness:92104::\"alert\",\"awake\"}   Attention Span Concentration Span {[facility] EXAM ATTENTION:87921::\"attention span and concentration are age appropriate\"}   Intellect {[facility] INTELLECTUAL FUNC:79156::\"not formally assessed\"}   Insight {[facility] EXAM; PSYCH INSIGHT/JUDGEMENT:49129::\"intact\"}   Judgement {[facility] EXAM; PSYCH INSIGHT/JUDGEMENT:95067::\"intact\"}   Muscle Strength and  Gait {[facility] PSYCH MUSCLE STRENGHT:06356::\"normal muscle strength and normal muscle tone\",\"normal gait and normal balance\"}   Motor activity {[facility] Psych Motor Activity:76719}   Language {[facility] PSYCH MENTAL STATUS LANGUAGE:43575::\"no difficulty naming common objects\"}   Fund of Knowledge {[facility] PSYCH MENTAL STATUS FUND OF KNOWLEDGE:51682::\"adequate knowledge of current events\"}   Pain {[facility] PSYCH PAIN:04498::\"none\"}   Pain Scale {[facility] PAIN SCALE NUMBERS:57175::\"0\"}             Current Outpatient Medications   Medication Sig Dispense Refill    lurasidone (LATUDA) 20 mg tablet Take 1 tablet (20 mg total) by mouth daily with dinner 90 tablet 1    Accu-Chek Guide test strip daily Test blood sugar      Acetaminophen (TYLENOL PO) Take 1,000 mg by mouth every 4 (four) hours as needed      acetaminophen (TYLENOL) 500 mg tablet Take 1,000 mg by mouth every 6 (six) hours as needed      albuterol (PROVENTIL HFA,VENTOLIN HFA) 90 mcg/act inhaler Inhale 2 puffs every 6 (six) hours as needed for wheezing      Alphagan P 0.1 % Apply 1 drop to eye 2 (two) times a day      ammonium lactate (LAC-HYDRIN) 5 % lotion Apply topically 2 (two) times a day as needed      aspirin 81 MG tablet Take 81 mg by mouth daily      atenolol (TENORMIN) 25 mg tablet Take 25 mg by mouth daily      azelastine (ASTELIN) 0.1 % nasal spray 1 spray into each nostril 2 (two) times a day Use in each nostril as directed      cetirizine (ZyrTEC) 10 mg tablet Take 10 mg by mouth daily      Cholecalciferol (Vitamin D3) 25 MCG TABS Take by mouth      " clotrimazole-betamethasone (LOTRISONE) 1-0.05 % cream APPLY TOPICALLY TO AFFECTED AREA(S) TWICE DAILY FOR 10 DAYS TO  HEAD  OF  PENIS  AND  FORESKIN.      Diclofenac Sodium (VOLTAREN) 1 % APPLY 1 APPLICATION TOPICALLY 4 TIMES DAILY TO AFFECTED AREA      docusate sodium (COLACE) 100 mg capsule Take 100 mg by mouth 2 (two) times a day      fenofibrate micronized (LOFIBRA) 67 MG capsule Take 67 mg by mouth every morning before breakfast      fluticasone (FLONASE) 50 mcg/act nasal spray 1 spray into each nostril daily      fluticasone-salmeterol (Advair HFA) 45-21 MCG/ACT inhaler Take 2 puffs by mouth 2 (two) times a day      furosemide (LASIX) 20 mg tablet Take 20 mg by mouth daily      glimepiride (AMARYL) 2 mg tablet Take 2 mg by mouth every morning      hydrocortisone 1 % cream Apply topically if needed      Jardiance 25 MG TABS Take 25 mg by mouth daily      lisinopril (ZESTRIL) 5 mg tablet Take 10 mg by mouth daily      metFORMIN (GLUCOPHAGE) 500 mg tablet Take 500 mg by mouth 2 (two) times a day with meals      Multiple Vitamin (Multivitamin Adult) TABS Take by mouth      Multiple Vitamin (MULTIVITAMIN) tablet Take 1 tablet by mouth daily      Omega-3 Fatty Acids (FISH OIL) 1200 MG CAPS Take by mouth daily      Ozempic, 0.25 or 0.5 MG/DOSE, 2 MG/3ML injection pen Inject 0.25 mg under the skin Once a week      patient supplied medication in the morning Multivit/ vit c / iron tab      polyethylene glycol (MIRALAX) 17 g packet Take 17 g by mouth if needed      Silodosin 8 MG CAPS Take 1 capsule by mouth daily      simvastatin (ZOCOR) 20 mg tablet Take 40 mg by mouth daily at bedtime      sodium chloride (OCEAN) 0.65 % nasal spray 1 spray into each nostril as needed for congestion      testosterone cypionate (DEPO-TESTOSTERONE) 200 mg/mL SOLN Inject 50 mg into the shoulder, thigh, or buttocks every 14 (fourteen) days       No current facility-administered medications for this visit.         Psychotherapy Provided:  "    Individual psychotherapy provided: {EFO AMB Psychotherapy:87743::\"No\"}         Visit Time    Visit Start Time: ***   Visit Stop Time: ***  Total Visit Duration: {Psych Total Visit Time:47556}    Lora Hernández PA-C 10/23/24    This note was completed in part utilizing Dragon dictation Software. Grammatical, translation, syntax errors, random word insertions, spelling mistakes, and incomplete sentences may be an occasional consequence of this system secondary to software limitations with voice recognition, ambient noise, and hardware issues. If you have any questions or concerns about the content, text, or information contained within the body of this dictation, please contact the provider for clarification.   "

## 2024-10-23 NOTE — BH TREATMENT PLAN
"TREATMENT PLAN (Medication Management Only)        [facility]    Name and Date of Birth:  Ralph Leyva 64 y.o. 1960  Date of Treatment Plan: October 23, 2024  Diagnosis/Diagnoses:    1. Bipolar 1 disorder, depressed, partial remission (HCC)    2. Alcohol dependence in remission (HCC)      Strengths/Personal Resources for Self-Care: \" Family, recovery, ability to communicate\".  Area/Areas of need (in own words): \"Improve mobility, exercise\"  1. Long Term Goal: maintain mood stability.  Target Date:6 months - 4/23/2025  Person/Persons responsible for completion of goal: Ralph  2.  Short Term Objective (s) - How will we reach this goal?:   A. Provider new recommended medication/dosage changes and/or continue medication(s): continue current medications as prescribed.  B.  Exercise, join gym .  C. N/A.  Target Date:3 months - 1/23/2025  Person/Persons Responsible for Completion of Goal: Ralph  Progress Towards Goals: starting treatment  Treatment Modality: medication management every 3 months  Review due 180 days from date of this plan: 6 months - 4/23/2025  Expected length of service: ongoing treatment  My Physician/PA/NP and I have developed this plan together and I agree to work on the goals and objectives. I understand the treatment goals that were developed for my treatment.      "

## 2024-12-20 ENCOUNTER — TELEPHONE (OUTPATIENT)
Dept: PSYCHIATRY | Facility: CLINIC | Age: 64
End: 2024-12-20

## 2024-12-20 NOTE — TELEPHONE ENCOUNTER
Patient would like to know if he needs any blood work before his appointment 01/23/25.     Patient states that the provider is not coming up in his mycahrt to send messages to.

## 2025-01-23 ENCOUNTER — OFFICE VISIT (OUTPATIENT)
Dept: PSYCHIATRY | Facility: CLINIC | Age: 65
End: 2025-01-23
Payer: MEDICARE

## 2025-01-23 VITALS
DIASTOLIC BLOOD PRESSURE: 74 MMHG | HEART RATE: 75 BPM | WEIGHT: 253 LBS | SYSTOLIC BLOOD PRESSURE: 133 MMHG | BODY MASS INDEX: 37.47 KG/M2 | HEIGHT: 69 IN

## 2025-01-23 DIAGNOSIS — F10.21 ALCOHOL DEPENDENCE IN REMISSION (HCC): ICD-10-CM

## 2025-01-23 DIAGNOSIS — F31.75 BIPOLAR 1 DISORDER, DEPRESSED, PARTIAL REMISSION (HCC): Primary | ICD-10-CM

## 2025-01-23 PROCEDURE — 99214 OFFICE O/P EST MOD 30 MIN: CPT | Performed by: PHYSICIAN ASSISTANT

## 2025-01-23 RX ORDER — LURASIDONE HYDROCHLORIDE 20 MG/1
20 TABLET, FILM COATED ORAL
Qty: 90 TABLET | Refills: 1 | Status: SHIPPED | OUTPATIENT
Start: 2025-01-23

## 2025-01-23 NOTE — PSYCH
MEDICATION MANAGEMENT NOTE        [facility]      Name and Date of Birth:  Ralph Leyva 64 y.o. 1960 MRN: [id number]    Insurance: Payor: [facility] DUAL COMPLETE MC REP / Plan: [facility] DUAL COMPLETE  REP / Product Type: Medicare HMO /     Date of Visit: January 23, 2025    Reason for Visit: No chief complaint on file.      Assessment & Plan  Bipolar 1 disorder, depressed, partial remission (HCC)  Continue Latuda 20 mg daily with food  Orders:    lurasidone (LATUDA) 20 mg tablet; Take 1 tablet (20 mg total) by mouth daily with dinner    Alcohol dependence in remission (HCC)  Continue to attend support group meetings  Has been sober for 25 years         We will follow-up in 3 months and he will call me sooner if any questions or concerns line aware of after-hours on-call service and [phone] crisis line      This note was not shared with the patient due to reasonable likelihood of causing patient harm    Risks/benefits/alternativies to treatment discussed, including potential adverse medication side effects, to which Ralph voiced understanding and consented fully to treatment.  Also, patient is amenable to calling/contacting the outpatient office including this writer if any acute adverse effects of their medication regimen arise in addition to any comments or concerns pertaining to their psychiatric management.      Medications Risks/Benefits      Risks, Benefits And Possible Side Effects Of Medications:    Risks, benefits, and possible side effects of medications explained to Ralph and he verbalizes understanding and agreement for treatment.    Controlled Medication Discussion:     Not Applicable         Subjective      Ralph Leyva is a 64 y.o. male, visited for No chief complaint on file., who was personally seen and evaluated today at the [facility] outpatient clinic for follow-up and medication management. Completes psychiatric  assessment without difficulty.     At previous outpatient psychiatric appointment with this writer, maintained on Latuda 20 mg daily.   He denies any current adverse medication side effects.      Overall, Ralph appears to be at his baseline.  Unfortunately states his previous counselor and friend's son passed away from a drug overdose and you just received the news today.  Supportive psychotherapy provided.  Otherwise he states that he has been doing well and continues to maintain his sobriety.  Appears bright and in good spirits.  Taking medication as prescribed.  Medications reviewed and updated.  States that he is continue to watch his diet and has been able to lose some weight since his last visit.  Vital signs were updated.  Has good interest and motivation.  Feels as though his moods have been stable and no significant depressive episodes.  No significant anxiety or panic attacks noted.      Review Of Systems:  Pertinent items are noted in HPI; all others are negative; no recent changes in medications or health status reported.    PHQ-2/9 Depression Screening                 Historical Information    Past Psychiatric History Update:   - No inpatient psychiatric admission since last encounter  - No SA or SIB since last encounter  - No incidence of violent behavior since last encounter    Past Trauma History Update:   - No new onset of abuse or traumatic events since last encounter     Past Medical History:    Past Medical History:   Diagnosis Date    Asthma     CPAP (continuous positive airway pressure) dependence     Diabetes mellitus (HCC)     Hyperlipidemia     Hypertension     Irregular heart beat     Seizures (HCC)     last 2002    Sleep apnea     Stroke (HCC)     mini        Past Surgical History:   Procedure Laterality Date    BACK SURGERY      COLONOSCOPY      HERNIA REPAIR      SC SURGICAL ARTHROSCOPY SHOULDER W/ROTATOR CUFF RPR Left 01/17/2017    Procedure: SHOULDER ARTHROSCOPY ROTATOR CUFF REPAIR ,  SAD;  Surgeon: Sergio Reid MD;  Location:  MAIN OR;  Service: Orthopedics    AK SURGICAL ARTHROSCOPY SHOULDER W/ROTATOR CUFF RPR Left 2/23/2024    Procedure: REVISION SHOULDER ARTHROSCOPIC ROTATOR CUFF REPAIR, BICEPS TENODESIS;  Surgeon: Sergio Reid MD;  [facility] MAIN OR;  Service: Orthopedics    ROTATOR CUFF REPAIR Right      Allergies   Allergen Reactions    Dipyridamole Other (See Comments)     Pt not sure if allergic/reaction- request to keep on chart    Other      Narcotics  Is in recovery    Lactose - Food Allergy GI Intolerance    Morphine And Codeine Other (See Comments)     In recovery    Perphenazine Rash       Substance Abuse History:    Social History     Substance and Sexual Activity   Alcohol Use No     Social History     Substance and Sexual Activity   Drug Use No       Social History:    Social History     Socioeconomic History    Marital status: Single     Spouse name: Not on file    Number of children: Not on file    Years of education: Not on file    Highest education level: Not on file   Occupational History    Not on file   Tobacco Use    Smoking status: Former    Smokeless tobacco: Never   Vaping Use    Vaping status: Never Used   Substance and Sexual Activity    Alcohol use: No    Drug use: No    Sexual activity: Not on file   Other Topics Concern    Not on file   Social History Narrative    Not on file     Social Drivers of Health     Financial Resource Strain: Low Risk  (5/12/2024)    [facility]    Overall Financial Resource Strain (CARDIA)     Difficulty of Paying Living Expenses: Not very hard   Food Insecurity: No Food Insecurity (5/12/2024)    [facility]    Hunger Vital Sign     Worried About Running Out of Food in the Last Year: Never true     Ran Out of Food in the Last Year: Never true   Transportation Needs: No Transportation Needs  (5/12/2024)    [facility]    PRAPARE - Transportation     Lack of Transportation (Medical): No     Lack of Transportation (Non-Medical): No   Physical Activity: Not on file   Stress: Stress Concern Present (5/12/2024)    [facility]    [employer] [place] [employer] - Occupational Stress Questionnaire     Feeling of Stress : To some extent   Social Connections: Feeling Socially Integrated (5/12/2024)    [facility]    OASIS : Social Isolation     How often do you feel lonely or isolated from those around you?: Rarely   Intimate Partner Violence: Not At Risk (5/12/2024)    [facility]    Humiliation, Afraid, Rape, and Kick questionnaire     Fear of Current or Ex-Partner: No     Emotionally Abused: No     Physically Abused: No     Sexually Abused: No   Housing Stability: Low Risk  (5/12/2024)    [facility]    Housing Stability Vital Sign     Unable to Pay for Housing in the Last Year: No     Number of Times Moved in the Last Year: 0     Homeless in the Last Year: No       Family Psychiatric History:     No family history on file.    History Review: The following portions of the patient's history were reviewed and updated as appropriate: allergies, current medications, past family history, past medical history, past social history, past surgical history and problem list.           Objective      Vital signs in last 24 hours:  There were no vitals filed for this visit.    Mental Status Evaluation:    Appearance age appropriate, casually dressed   Behavior cooperative, calm   Speech normal rate, normal volume, normal pitch   Mood euthymic   Affect normal range and intensity,  appropriate   Thought Processes organized, goal directed   Associations intact associations   Thought Content no overt delusions   Perceptual Disturbances: no auditory hallucinations, no visual hallucinations   Abnormal Thoughts  Risk Potential Suicidal ideation - None  Homicidal ideation - None  Potential for aggression - No   Orientation oriented to: person, place, time/date, and situation   Memory recent and remote memory grossly intact   Consciousness alert and awake   Attention Span Concentration Span attention span and concentration are age appropriate   Intellect not formally assessed   Insight intact   Judgement intact   Muscle Strength and  Gait normal muscle strength and normal muscle tone, normal gait and normal balance   Motor activity no abnormal movements   Language no difficulty naming common objects   Fund of Knowledge adequate knowledge of current events   Pain none   Pain Scale 0             Current Outpatient Medications   Medication Sig Dispense Refill    Accu-Chek Guide test strip daily Test blood sugar      Acetaminophen (TYLENOL PO) Take 1,000 mg by mouth every 4 (four) hours as needed      acetaminophen (TYLENOL) 500 mg tablet Take 1,000 mg by mouth every 6 (six) hours as needed      albuterol (PROVENTIL HFA,VENTOLIN HFA) 90 mcg/act inhaler Inhale 2 puffs every 6 (six) hours as needed for wheezing      Alphagan P 0.1 % Apply 1 drop to eye 2 (two) times a day      ammonium lactate (LAC-HYDRIN) 5 % lotion Apply topically 2 (two) times a day as needed      aspirin 81 MG tablet Take 81 mg by mouth daily      atenolol (TENORMIN) 25 mg tablet Take 25 mg by mouth daily      azelastine (ASTELIN) 0.1 % nasal spray 1 spray into each nostril 2 (two) times a day Use in each nostril as directed      cetirizine (ZyrTEC) 10 mg tablet Take 10 mg by mouth daily      Cholecalciferol (Vitamin D3) 25 MCG TABS Take by mouth      clotrimazole-betamethasone (LOTRISONE) 1-0.05 % cream APPLY TOPICALLY TO AFFECTED  AREA(S) TWICE DAILY FOR 10 DAYS TO  HEAD  OF  PENIS  AND  FORESKIN.      Diclofenac Sodium (VOLTAREN) 1 % APPLY 1 APPLICATION TOPICALLY 4 TIMES DAILY TO AFFECTED AREA      docusate sodium (COLACE) 100 mg capsule Take 100 mg by mouth 2 (two) times a day      fenofibrate micronized (LOFIBRA) 67 MG capsule Take 67 mg by mouth every morning before breakfast      fluticasone (FLONASE) 50 mcg/act nasal spray 1 spray into each nostril daily      fluticasone-salmeterol (Advair HFA) 45-21 MCG/ACT inhaler Take 2 puffs by mouth 2 (two) times a day      furosemide (LASIX) 20 mg tablet Take 20 mg by mouth daily      glimepiride (AMARYL) 2 mg tablet Take 2 mg by mouth every morning      hydrocortisone 1 % cream Apply topically if needed      Jardiance 25 MG TABS Take 25 mg by mouth daily      lisinopril (ZESTRIL) 5 mg tablet Take 10 mg by mouth daily      lurasidone (LATUDA) 20 mg tablet Take 1 tablet (20 mg total) by mouth daily with dinner 90 tablet 1    metFORMIN (GLUCOPHAGE) 500 mg tablet Take 500 mg by mouth 2 (two) times a day with meals      Multiple Vitamin (Multivitamin Adult) TABS Take by mouth      Multiple Vitamin (MULTIVITAMIN) tablet Take 1 tablet by mouth daily      Omega-3 Fatty Acids (FISH OIL) 1200 MG CAPS Take by mouth daily      Ozempic, 0.25 or 0.5 MG/DOSE, 2 MG/3ML injection pen Inject 0.25 mg under the skin Once a week      patient supplied medication in the morning Multivit/ vit c / iron tab      polyethylene glycol (MIRALAX) 17 g packet Take 17 g by mouth if needed      Silodosin 8 MG CAPS Take 1 capsule by mouth daily      simvastatin (ZOCOR) 20 mg tablet Take 40 mg by mouth daily at bedtime      sodium chloride (OCEAN) 0.65 % nasal spray 1 spray into each nostril as needed for congestion      testosterone cypionate (DEPO-TESTOSTERONE) 200 mg/mL SOLN Inject 50 mg into the shoulder, thigh, or buttocks every 14 (fourteen) days       No current facility-administered medications for this visit.          Psychotherapy Provided:     Individual psychotherapy provided: No         Visit Time    Visit Start Time: 1020   Visit Stop Time: 1050  Total Visit Duration:  30 minutes    Lora Hernández PA-C 01/23/25    This note was completed in part utilizing Dragon dictation Software. Grammatical, translation, syntax errors, random word insertions, spelling mistakes, and incomplete sentences may be an occasional consequence of this system secondary to software limitations with voice recognition, ambient noise, and hardware issues. If you have any questions or concerns about the content, text, or information contained within the body of this dictation, please contact the provider for clarification.

## 2025-05-01 ENCOUNTER — OFFICE VISIT (OUTPATIENT)
Dept: PSYCHIATRY | Facility: CLINIC | Age: 65
End: 2025-05-01
Payer: MEDICARE

## 2025-05-01 VITALS — WEIGHT: 252 LBS | BODY MASS INDEX: 37.76 KG/M2

## 2025-05-01 DIAGNOSIS — F10.21 ALCOHOL DEPENDENCE IN REMISSION (HCC): ICD-10-CM

## 2025-05-01 DIAGNOSIS — F31.75 BIPOLAR 1 DISORDER, DEPRESSED, PARTIAL REMISSION (HCC): Primary | ICD-10-CM

## 2025-05-01 PROCEDURE — 99214 OFFICE O/P EST MOD 30 MIN: CPT | Performed by: PHYSICIAN ASSISTANT

## 2025-05-01 RX ORDER — ALFUZOSIN HYDROCHLORIDE 10 MG/1
10 TABLET, EXTENDED RELEASE ORAL DAILY
COMMUNITY
Start: 2025-04-08

## 2025-05-01 RX ORDER — LURASIDONE HYDROCHLORIDE 20 MG/1
20 TABLET, FILM COATED ORAL
Qty: 90 TABLET | Refills: 1 | Status: SHIPPED | OUTPATIENT
Start: 2025-05-01

## 2025-05-01 NOTE — PSYCH
MEDICATION MANAGEMENT NOTE        [facility]      Name and Date of Birth:  Ralph Leyva 64 y.o. 1960 MRN: [id number]    Insurance: Payor: [employer]  REP / Plan: [employer] ASSURED / Product Type: Medicare HMO /     Date of Visit: May 1, 2025    Reason for Visit:   Chief Complaint   Patient presents with    Follow-up    Medication Management     This note was not shared with the patient due to reasonable likelihood of causing patient harm    Assessment & Plan  Bipolar 1 disorder, depressed, partial remission (HCC)  Stable  Continue Latuda 20 mg daily with dinner  Orders:    lurasidone (LATUDA) 20 mg tablet; Take 1 tablet (20 mg total) by mouth daily with dinner    Alcohol dependence in remission (HCC)  Remains stable and in remission for 25 years           Follow-up in 3 months and he will call me sooner if any questions or concerns      Risks/benefits/alternativies to treatment discussed, including potential adverse medication side effects, to which Ralph voiced understanding and consented fully to treatment.  Also, patient is amenable to calling/contacting the outpatient office including this writer if any acute adverse effects of their medication regimen arise in addition to any comments or concerns pertaining to their psychiatric management.      Medications Risks/Benefits      Risks, Benefits And Possible Side Effects Of Medications:    Risks, benefits, and possible side effects of medications explained to Ralph and he verbalizes understanding and agreement for treatment.    Controlled Medication Discussion:     Not applicable         Subjective      Ralph Leyva is a 64 y.o. male, visited for Follow-up and Medication Management, who was personally seen and evaluated today at the [facility] outpatient clinic for follow-up and medication management. Completes psychiatric assessment  without difficulty.     At previous outpatient psychiatric appointment with this writer, maintained on Latuda.   He denies any current adverse medication side effects.      Overall, Rlaph states that he has been feeling slightly down.  Notes that his testosterone doses was decreased which is likely the culprit.  No overall significant depressive signs or symptoms though.  No evidence of raman.  He is sleeping and eating adequately.  Discussed his history of hoarding and states that he has been cleaning things up.  States that this has not occurred recently and has been accumulation over many years.  States that he does not feel as though it is a concern now since he is recognizing and cleaning things out more.  Has been compliant with taking Latuda.  Discussed taking in the evening with his dinner meal.  Also states that he did exercise a couple times after our last visit but has not continued.  Discussed exercising 2 times per week which she is in agreement with.        Review Of Systems:  Pertinent items are noted in HPI; all others are negative; no recent changes in medications or health status reported.    PHQ-2/9 Depression Screening                 Historical Information    Past Psychiatric History Update:   - No inpatient psychiatric admission since last encounter  - No SA or SIB since last encounter  - No incidence of violent behavior since last encounter    Past Trauma History Update:   - No new onset of abuse or traumatic events since last encounter     Past Medical History:    Past Medical History:   Diagnosis Date    Asthma     CPAP (continuous positive airway pressure) dependence     Diabetes mellitus (HCC)     Hyperlipidemia     Hypertension     Irregular heart beat     Seizures (HCC)     last 2002    Sleep apnea     Stroke (HCC)     mini        Past Surgical History:   Procedure Laterality Date    BACK SURGERY      COLONOSCOPY      HERNIA REPAIR      CT SURGICAL ARTHROSCOPY SHOULDER W/ROTATOR CUFF RPR  Left 01/17/2017    Procedure: SHOULDER ARTHROSCOPY ROTATOR CUFF REPAIR , SAD;  Surgeon: Sergio Reid MD;  Location:  MAIN OR;  Service: Orthopedics    MS SURGICAL ARTHROSCOPY SHOULDER W/ROTATOR CUFF RPR Left 2/23/2024    Procedure: REVISION SHOULDER ARTHROSCOPIC ROTATOR CUFF REPAIR, BICEPS TENODESIS;  Surgeon: Sergio Reid MD;  [facility]: [facility] MAIN OR;  Service: Orthopedics    ROTATOR CUFF REPAIR Right      Allergies   Allergen Reactions    Dipyridamole Other (See Comments)     Pt not sure if allergic/reaction- request to keep on chart    Other      Narcotics  Is in recovery    Lactose - Food Allergy GI Intolerance    Morphine And Codeine Other (See Comments)     In recovery    Perphenazine Rash       Substance Abuse History:    Social History     Substance and Sexual Activity   Alcohol Use No     Social History     Substance and Sexual Activity   Drug Use No       Social History:    Social History     Socioeconomic History    Marital status: Single     Spouse name: Not on file    Number of children: Not on file    Years of education: Not on file    Highest education level: Not on file   Occupational History    Not on file   Tobacco Use    Smoking status: Former    Smokeless tobacco: Never   Vaping Use    Vaping status: Never Used   Substance and Sexual Activity    Alcohol use: No    Drug use: No    Sexual activity: Not on file   Other Topics Concern    Not on file   Social History Narrative    Not on file     Social Drivers of Health     Financial Resource Strain: Low Risk  (5/12/2024)    [facility]    Overall Financial Resource Strain (CARDIA)     Difficulty of Paying Living Expenses: Not very hard   Food Insecurity: No Food Insecurity (5/12/2024)    [facility]    Hunger Vital Sign     Worried About Running Out of Food in the Last Year: Never true     Ran Out of Food in the  Last Year: Never true   Transportation Needs: No Transportation Needs (5/12/2024)    Received from ,     PRAPARE - Transportation     Lack of Transportation (Medical): No     Lack of Transportation (Non-Medical): No   Physical Activity: Not on file   Stress: Stress Concern Present (5/12/2024)    Received from ,     [employer] - Occupational Stress Questionnaire     Feeling of Stress : To some extent   Social Connections: Feeling Socially Integrated (5/12/2024)    Received from ,     OASIS : Social Isolation     How often do you feel lonely or isolated from those around you?: Rarely   Intimate Partner Violence: Not At Risk (5/12/2024)    Received from , ,     Humiliation, Afraid, Rape, and Kick questionnaire     Fear of Current or Ex-Partner: No     Emotionally Abused: No     Physically Abused: No     Sexually Abused: No   Housing Stability: Low Risk  (5/12/2024)    Received from ,     Housing Stability Vital Sign     Unable to Pay for Housing in the Last Year: No     Number of Times Moved in the Last Year: 0     Homeless in the Last Year: No       Family Psychiatric History:     No family history on file.    History Review: The following portions of the patient's history were reviewed and updated as appropriate: allergies, current medications, past family history, past medical history, past social history, past surgical history and problem list.           Objective      Vital signs in last 24 hours:  There were no vitals filed for this visit.    Mental Status Evaluation:    Appearance age appropriate, casually dressed   Behavior cooperative, calm   Speech normal rate, normal  volume, normal pitch   Mood euthymic   Affect normal range and intensity, appropriate   Thought Processes organized, goal directed   Associations intact associations   Thought Content no overt delusions   Perceptual Disturbances: no auditory hallucinations, no visual hallucinations   Abnormal Thoughts  Risk Potential Suicidal ideation - None  Homicidal ideation - None  Potential for aggression - No   Orientation oriented to: person, place, time/date, and situation   Memory recent and remote memory grossly intact   Consciousness alert and awake   Attention Span Concentration Span attention span and concentration are age appropriate   Intellect not formally assessed   Insight intact   Judgement intact   Muscle Strength and  Gait normal muscle strength and normal muscle tone, normal gait and normal balance   Motor activity no abnormal movements   Language no difficulty naming common objects   Fund of Knowledge adequate knowledge of current events   Pain none   Pain Scale 0             Current Outpatient Medications   Medication Sig Dispense Refill    Accu-Chek Guide test strip daily Test blood sugar      Acetaminophen (TYLENOL PO) Take 1,000 mg by mouth every 4 (four) hours as needed      acetaminophen (TYLENOL) 500 mg tablet Take 1,000 mg by mouth every 6 (six) hours as needed      albuterol (PROVENTIL HFA,VENTOLIN HFA) 90 mcg/act inhaler Inhale 2 puffs every 6 (six) hours as needed for wheezing      Alphagan P 0.1 % Apply 1 drop to eye 2 (two) times a day      ammonium lactate (LAC-HYDRIN) 5 % lotion Apply topically 2 (two) times a day as needed      aspirin 81 MG tablet Take 81 mg by mouth daily      atenolol (TENORMIN) 25 mg tablet Take 25 mg by mouth daily      azelastine (ASTELIN) 0.1 % nasal spray 1 spray into each nostril 2 (two) times a day Use in each nostril as directed      cetirizine (ZyrTEC) 10 mg tablet Take 10 mg by mouth daily      Cholecalciferol (Vitamin D3) 25 MCG TABS Take by mouth       clotrimazole-betamethasone (LOTRISONE) 1-0.05 % cream APPLY TOPICALLY TO AFFECTED AREA(S) TWICE DAILY FOR 10 DAYS TO  HEAD  OF  PENIS  AND  FORESKIN.      Diclofenac Sodium (VOLTAREN) 1 % APPLY 1 APPLICATION TOPICALLY 4 TIMES DAILY TO AFFECTED AREA      docusate sodium (COLACE) 100 mg capsule Take 100 mg by mouth 2 (two) times a day      fenofibrate micronized (LOFIBRA) 67 MG capsule Take 67 mg by mouth every morning before breakfast      fluticasone (FLONASE) 50 mcg/act nasal spray 1 spray into each nostril daily      fluticasone-salmeterol (Advair HFA) 45-21 MCG/ACT inhaler Take 2 puffs by mouth 2 (two) times a day      glimepiride (AMARYL) 2 mg tablet Take 2 mg by mouth every morning      hydrocortisone 1 % cream Apply topically if needed      Jardiance 25 MG TABS Take 25 mg by mouth daily      lisinopril (ZESTRIL) 5 mg tablet Take 30 mg by mouth daily      lurasidone (LATUDA) 20 mg tablet Take 1 tablet (20 mg total) by mouth daily with dinner 90 tablet 1    metFORMIN (GLUCOPHAGE) 500 mg tablet Take 500 mg by mouth 2 (two) times a day with meals      Multiple Vitamin (Multivitamin Adult) TABS Take by mouth      Multiple Vitamin (MULTIVITAMIN) tablet Take 1 tablet by mouth daily      Omega-3 Fatty Acids (FISH OIL) 1200 MG CAPS Take by mouth daily      Ozempic, 0.25 or 0.5 MG/DOSE, 2 MG/3ML injection pen Inject 0.25 mg under the skin Once a week      patient supplied medication in the morning Multivit/ vit c / iron tab      polyethylene glycol (MIRALAX) 17 g packet Take 17 g by mouth if needed      Silodosin 8 MG CAPS Take 1 capsule by mouth daily      simvastatin (ZOCOR) 20 mg tablet Take 40 mg by mouth daily at bedtime      sodium chloride (OCEAN) 0.65 % nasal spray 1 spray into each nostril as needed for congestion      testosterone cypionate (DEPO-TESTOSTERONE) 200 mg/mL SOLN Inject 50 mg into the shoulder, thigh, or buttocks every 14 (fourteen) days       No current facility-administered medications for this  visit.         Psychotherapy Provided:     Individual psychotherapy provided: No         Visit Time    Visit Start Time: 1100   Visit Stop Time: 1120  Total Visit Duration:  20 minutes    Lora Hernández PA-C 05/01/25    This note was completed in part utilizing Dragon dictation Software. Grammatical, translation, syntax errors, random word insertions, spelling mistakes, and incomplete sentences may be an occasional consequence of this system secondary to software limitations with voice recognition, ambient noise, and hardware issues. If you have any questions or concerns about the content, text, or information contained within the body of this dictation, please contact the provider for clarification.

## 2025-07-02 NOTE — PROGRESS NOTES
Daily Note     Today's date: 2024  Patient name: Ralph Leyva  : 1960  MRN: [id number]  Referring provider: Sergio Reid*  Dx:   Encounter Diagnosis     ICD-10-CM    1. Tear of left supraspinatus tendon  M75.102           Start Time: 1445  Stop Time: 1530  Total time in clinic (min): 45 minutes    Subjective: Pt states he is feeling good today, no new c/o pain or symptoms. Pt states he continues to have symptoms with shoulder abduction.     Objective: See treatment diary below      Assessment: Pt tolerated warm up on UBE well at beginning of session without increase in discomfort during or after activity. Discharged exercises as noted due to good signs of range of motion WNLs.  Pt performed shoulder scaption and abduction raises with improved form compared to last performed. Overall, pt is showing good improvements in L shoulder strength, mobility, and ROM and would benefit from continued skilled PT to improve functional deficits. Continue to progress as able.       Plan: Continue per plan of care.  Progress treatment as tolerated.       Precautions: s/p L shoulder arthroscopic RTC repair with biceps tenodesis on 24  As per MD (24): NWB LUE in sling x 6 weeks (remove sling on 24).  Can remove Pillow at any time as it's for comfort only.  For now, may start elbow/wrist/hand range of motion. Pendulums to tolerance   Protocol in office visit with MD on 24  NO STRENGTHENING UNTIL WEEK 12  Isometrics ok, no more than coffee cup weight (2#) until 12 weeks.    Date    Visit # 31    25 26 27 28 29 30   FOTO          done   Re-eval               Manuals    L shoulder PROM      HY DK HY                                            Neuro Re-Ed    Row/ Ext  12# 2x15    BTB 2x10 BTB 2x10 2x15 row/ext BTB 2x15 row/ext BTB nv 2x15 row/ext black TB   No monies 20x OTB         20x  "OTB                                          Pt Edu             Ther Ex 6/13 5/21 5/28 5/30 6/4 6/7 6/11   ER walkout 10x3 steps  4#    10x3 steps BTB 10x3 steps BTB 10x3 steps black TB 10x3 steps black TB 10x3 steps black TB  10x3 steps black TB   IR walkout 10x3 steps 4#     10x3 steps BTB 10x3 steps BTB 10x3 step black 10x3 steps black TB 10x3 steps black TB 10x3 steps black TB   ER/IR  6#/4# 2x15      2x10 RTB active 2x10 RTB active 2x10 GTB active 2x10 BTB active   SL ER 2x10 ER, s/l abd       2x10 2x10 ER, s/l abd 2x10 s/l abd   Prone rows 2x10 6# L    2x10 3# L 2x10 3# L 2x10 5# L 2x10 5# L 2x10 5# L 2x10 6# L   Prone T& I 2x10 active       2x10 0# 2x10 active 2x10 T   Bicep curls 2x10 6# ea    2x15 3# L 2x15 3# L 2x15 5# L 2x10 5# L 2x10 5# ea 2x10 6# ea   Table slides             Shoulder raises 2x10 scap to 120 deg, 2x10 abd 90+ deg         2x10 scap to 120 deg, 2x10 abd 90+ deg   Deltoid isometrics             Wall slides             Finger ladder     10x10\" 10x10\" 10x10\" 10x10\" 10x10\" 10x10\" abd   AAROM w cane     2x10 flex supine 5\", 2x10 abd stand 5\" 2x10 flex supine 5\", 2x10 abd stand 5\" 2x10 flex supine, 2x10 abd stand 2x10 flex supine, 2x10 abd stand 2x10 flex sup, 2x10 3\" abd stand slow    pulleys DC    3' 3' 4' 4' 5' 5'   UBE 3'/3'    3'/3' 3'/3' 3'/3' 3'/3' 3'/3' 3'/3'                Ther Activity 6/13 5/21 5/28 6/4 6/11   Cones 4x5 cones flex, abd 0#         4x5 cones flex, abd 0#                Gait Training 6/13 5/21 5/28  6/4                               Modalities 6/13 5/21 5/28 6/4 6/7 6/11   ice 10' post        10' post 10' post                             " Family

## 2025-07-25 ENCOUNTER — OFFICE VISIT (OUTPATIENT)
Dept: PODIATRY | Facility: CLINIC | Age: 65
End: 2025-07-25
Payer: MEDICARE

## 2025-07-25 DIAGNOSIS — B35.1 ONYCHOMYCOSIS: ICD-10-CM

## 2025-07-25 DIAGNOSIS — M79.675 PAIN IN TOES OF BOTH FEET: ICD-10-CM

## 2025-07-25 DIAGNOSIS — R60.1 GENERALIZED EDEMA: ICD-10-CM

## 2025-07-25 DIAGNOSIS — L85.3 XEROSIS OF SKIN: Primary | ICD-10-CM

## 2025-07-25 DIAGNOSIS — E11.40 CONTROLLED TYPE 2 DIABETES WITH NEUROPATHY (HCC): ICD-10-CM

## 2025-07-25 DIAGNOSIS — M79.674 PAIN IN TOES OF BOTH FEET: ICD-10-CM

## 2025-07-25 PROCEDURE — 99203 OFFICE O/P NEW LOW 30 MIN: CPT | Performed by: PODIATRIST

## 2025-07-25 PROCEDURE — 11721 DEBRIDE NAIL 6 OR MORE: CPT | Performed by: PODIATRIST

## 2025-07-25 RX ORDER — AMMONIUM LACTATE 12 G/100G
LOTION TOPICAL 2 TIMES DAILY
Qty: 225 G | Refills: 4 | Status: SHIPPED | OUTPATIENT
Start: 2025-07-25 | End: 2025-12-22

## 2025-07-25 NOTE — ASSESSMENT & PLAN NOTE
Lab Results   Component Value Date    BA1C 6.1 (H) 07/22/2025       Orders:    ammonium lactate (LAC-HYDRIN) 12 % lotion; Apply topically 2 (two) times a day    Compression Stocking

## 2025-07-25 NOTE — PROGRESS NOTES
Name: Ralph Leyva      : 1960      MRN: [id number]  Encounter Provider: Sergio Muñoz DPM  Encounter Date: 2025   Encounter department: [facility] [place]  :  Assessment & Plan  Onychomycosis       Debride mycotic nails and thin the nail plates x 6 with the use of a nail nipper manually and an electric Dremel bur was used to reduce the thickness of the nail beds and smoothed the distal aspect of the nails.   Xerosis of skin    Orders:    ammonium lactate (LAC-HYDRIN) 12 % lotion; Apply topically 2 (two) times a day    Generalized edema    Orders:    Compression Stocking    Controlled type 2 diabetes with neuropathy (HCC)    Lab Results   Component Value Date    HGBA1C 6.1 (H) 2025       Orders:    ammonium lactate (LAC-HYDRIN) 12 % lotion; Apply topically 2 (two) times a day    Compression Stocking    Pain in toes of both feet         Reviewed PCPs note from 2025.  Discussed proper shoe gear, daily inspections of feet, and general foot health with patient. Patient has Q9  findings and is recommended for at risk foot care every 9-10 weeks.    Return in about 3 months (around 10/25/2025).     History of Present Illness   HPI  Ralph Leyva is a 65 y.o. male who presents with chief complaint of painful thick nails on both feet.  He is a diabetic whose last A1c was 6.1 drawn on 2025.  Patient is also inquiring about lotion for his dry skin as well as compression stockings for his legs.  Patient does relate he had prior gone to another podiatrist for his footcare.  Patient presents for at-risk foot care.  Patient has no acute concerns today.  Patient has significant lower extremity risk due to neuropathy, parasthesia, edema, and trophic skin changes to the lower extremity.   History obtained from: patient    Review of Systems  Medical History Reviewed by provider this encounter:     .  Medications Ordered Prior to Encounter[1]   Social History[2]     Objective    There were no vitals taken for this visit.     Physical Exam    Cardiovascular:      Pulses: no weak pulses.           Dorsalis pedis pulses are 1+ on the right side and 1+ on the left side.        Posterior tibial pulses are 1+ on the right side and 1+ on the left side.   Feet:      Right foot:      Skin integrity: Dry skin present. No ulcer, skin breakdown, erythema, warmth or callus.      Left foot:      Skin integrity: Dry skin present. No ulcer, skin breakdown, erythema, warmth or callus.       Vascular status is 1/4 DP PT negative digital hair, normal distal cooling, immediate capillary refill bilaterally.  There edema is present bilaterally of +1 to +2.  The capillary refill is approximately 2 seconds.    Derm nails are brittle elongated hypertrophic white-yellow discoloration with subungual debris x 6.  There is an increased thickness in the nails of approximately 1 to 2 mm.  There is white flaky tissue present on the plantar aspect of both feet.  There is no fissures or dried blood present within the tissue.    Ortho mild hammertoe deformities are present on the 4th and 5th digits bilaterally with the fifth digit being in a slight adductovarus position.    Neuro light touch is diminished bilaterally.  0.5 monofilament test was performed and patient was able to to feel it slightly in the arch area bilaterally but was unable to feel it is submet 3 and on the plantar aspect of the 3rd and 4th toes.    Diabetic Foot Exam    Patient's shoes and socks removed.    Right Foot/Ankle   Right Foot Inspection  Skin Exam: dry skin. Skin not intact, no warmth, no callus, no erythema, no maceration, no abnormal color, no pre-ulcer, no ulcer and no callus.     Toe Exam: right toe deformity. No swelling, no tenderness and erythema    Sensory   Vibration: diminished  Monofilament testing: absent    Vascular  Capillary refills: < 3 seconds  The right DP pulse is 1+. The right PT pulse is 1+.     Left Foot/Ankle  Left Foot  Inspection  Skin Exam: dry skin. Skin not intact, no warmth, no erythema, no maceration, normal color, no pre-ulcer, no ulcer and no callus.     Toe Exam: left toe deformity. No swelling, no tenderness and no erythema.     Sensory   Vibration: diminished  Monofilament testing: absent    Vascular  Capillary refills: < 3 seconds  The left DP pulse is 1+. The left PT pulse is 1+.     Assign Risk Category  Deformity present  Loss of protective sensation  No weak pulses  Risk: 1         [1]   Current Outpatient Medications on File Prior to Visit   Medication Sig Dispense Refill    Accu-Chek Guide test strip daily Test blood sugar      Acetaminophen (TYLENOL PO) Take 1,000 mg by mouth every 4 (four) hours as needed      acetaminophen (TYLENOL) 500 mg tablet Take 1,000 mg by mouth every 6 (six) hours as needed      albuterol (PROVENTIL HFA,VENTOLIN HFA) 90 mcg/act inhaler Inhale 2 puffs every 6 (six) hours as needed for wheezing      alfuzosin (UROXATRAL) 10 mg 24 hr tablet Take 10 mg by mouth daily      Alphagan P 0.1 % Apply 1 drop to eye 2 (two) times a day      aspirin 81 MG tablet Take 81 mg by mouth daily      atenolol (TENORMIN) 25 mg tablet Take 25 mg by mouth daily      azelastine (ASTELIN) 0.1 % nasal spray 1 spray into each nostril 2 (two) times a day Use in each nostril as directed      cetirizine (ZyrTEC) 10 mg tablet Take 10 mg by mouth daily      Cholecalciferol (Vitamin D3) 25 MCG TABS Take by mouth      clotrimazole-betamethasone (LOTRISONE) 1-0.05 % cream APPLY TOPICALLY TO AFFECTED AREA(S) TWICE DAILY FOR 10 DAYS TO  HEAD  OF  PENIS  AND  FORESKIN.      Diclofenac Sodium (VOLTAREN) 1 % APPLY 1 APPLICATION TOPICALLY 4 TIMES DAILY TO AFFECTED AREA      docusate sodium (COLACE) 100 mg capsule Take 100 mg by mouth 2 (two) times a day      fenofibrate micronized (LOFIBRA) 67 MG capsule Take 67 mg by mouth every morning before breakfast      fluticasone (FLONASE) 50 mcg/act nasal spray 1 spray into each  nostril daily      fluticasone-salmeterol (Advair HFA) 45-21 MCG/ACT inhaler Take 2 puffs by mouth 2 (two) times a day      glimepiride (AMARYL) 2 mg tablet Take 2 mg by mouth every morning      hydrocortisone 1 % cream Apply topically if needed      Jardiance 25 MG TABS Take 25 mg by mouth daily      lisinopril (ZESTRIL) 5 mg tablet Take 30 mg by mouth daily      lurasidone (LATUDA) 20 mg tablet Take 1 tablet (20 mg total) by mouth daily with dinner 90 tablet 1    metFORMIN (GLUCOPHAGE) 500 mg tablet Take 500 mg by mouth 2 (two) times a day with meals      Multiple Vitamin (Multivitamin Adult) TABS Take by mouth      Multiple Vitamin (MULTIVITAMIN) tablet Take 1 tablet by mouth daily      Omega-3 Fatty Acids (FISH OIL) 1200 MG CAPS Take by mouth daily      Ozempic, 0.25 or 0.5 MG/DOSE, 2 MG/3ML injection pen Inject 0.25 mg under the skin Once a week      patient supplied medication in the morning Multivit/ vit c / iron tab      polyethylene glycol (MIRALAX) 17 g packet Take 17 g by mouth if needed      simvastatin (ZOCOR) 20 mg tablet Take 40 mg by mouth daily at bedtime      sodium chloride (OCEAN) 0.65 % nasal spray 1 spray into each nostril as needed for congestion      testosterone cypionate (DEPO-TESTOSTERONE) 200 mg/mL SOLN Inject 50 mg into the shoulder, thigh, or buttocks every 14 (fourteen) days      [DISCONTINUED] ammonium lactate (LAC-HYDRIN) 5 % lotion Apply topically 2 (two) times a day as needed       No current facility-administered medications on file prior to visit.   [2]   Social History  Tobacco Use    Smoking status: Former    Smokeless tobacco: Never   Vaping Use    Vaping status: Never Used   Substance and Sexual Activity    Alcohol use: No    Drug use: No

## 2025-08-08 ENCOUNTER — TELEPHONE (OUTPATIENT)
Age: 65
End: 2025-08-08

## 2025-08-13 ENCOUNTER — OFFICE VISIT (OUTPATIENT)
Dept: PSYCHIATRY | Facility: CLINIC | Age: 65
End: 2025-08-13
Payer: COMMERCIAL

## (undated) DEVICE — BLADE SHAVER DISSECTOR 3.5MM 13CM COOLCUT

## (undated) DEVICE — SHOULDER SUSPENSION KIT 6 PER BOX

## (undated) DEVICE — GLOVE SRG BIOGEL 7.5

## (undated) DEVICE — PROBE ABLATION  APOLLO RF ASPIRING 90 DEG

## (undated) DEVICE — TUBING SUCTION 5MM X 12 FT

## (undated) DEVICE — DRESSING MEPILEX AG BORDER 4 X 4 IN

## (undated) DEVICE — PACK PBDS SHOULDER ARTHROSCOPY RF

## (undated) DEVICE — GLOVE INDICATOR PI UNDERGLOVE SZ 7.5 BLUE

## (undated) DEVICE — 3M™ IOBAN™ 2 ANTIMICROBIAL INCISE DRAPE 6650EZ: Brand: IOBAN™ 2

## (undated) DEVICE — THREADED CLEAR CANNULA WITH OBTURATOR 7MM X 75MM

## (undated) DEVICE — SKN PRP WNG SPNGE PVP SCRB STR: Brand: MEDLINE INDUSTRIES, INC.

## (undated) DEVICE — SUT MONOCRYL 4-0 PS-2 27 IN Y426H

## (undated) DEVICE — THREADED CLEAR CANNULA WITH OBTURATOR 8.5MM X 75MM

## (undated) DEVICE — 3M™ STERI-STRIP™ REINFORCED ADHESIVE SKIN CLOSURES, R1547, 1/2 IN X 4 IN (12 MM X 100 MM), 6 STRIPS/ENVELOPE: Brand: 3M™ STERI-STRIP™

## (undated) DEVICE — EXPRESSEW III SUTURE NEEDLE FOR USE WITH EXPRESSEW II OR III SUTURE PASSER: Brand: EXPRESSEW

## (undated) DEVICE — GLOVE SRG BIOGEL ECLIPSE 7

## (undated) DEVICE — 3M™ STERI-STRIP™ BLEND TONE SKIN CLOSURES, B1557, TAN, 1/2 IN X 4 IN (12MM X 100MM), 6 STRIPS/ENVELOPE: Brand: 3M™ STERI-STRIP™

## (undated) DEVICE — INTENDED FOR TISSUE SEPARATION, AND OTHER PROCEDURES THAT REQUIRE A SHARP SURGICAL BLADE TO PUNCTURE OR CUT.: Brand: BARD-PARKER ® CARBON RIB-BACK BLADES

## (undated) DEVICE — VAPR COOLPULSE 90 ELECTRODE 90 DEGREES SUCTION WITH INTEGRATED HANDPIECE: Brand: VAPR COOLPULSE

## (undated) DEVICE — BLADE SHAVER DISSECTOR 4MM 13CM COOLCUT